# Patient Record
Sex: FEMALE | Race: WHITE | NOT HISPANIC OR LATINO | Employment: FULL TIME | URBAN - METROPOLITAN AREA
[De-identification: names, ages, dates, MRNs, and addresses within clinical notes are randomized per-mention and may not be internally consistent; named-entity substitution may affect disease eponyms.]

---

## 2017-02-06 ENCOUNTER — OFFICE VISIT (OUTPATIENT)
Dept: FAMILY MEDICINE | Facility: CLINIC | Age: 62
End: 2017-02-06

## 2017-02-06 VITALS
HEART RATE: 94 BPM | OXYGEN SATURATION: 98 % | HEIGHT: 67 IN | WEIGHT: 153.4 LBS | TEMPERATURE: 97.9 F | SYSTOLIC BLOOD PRESSURE: 130 MMHG | DIASTOLIC BLOOD PRESSURE: 78 MMHG | RESPIRATION RATE: 16 BRPM | BODY MASS INDEX: 24.08 KG/M2

## 2017-02-06 DIAGNOSIS — R05.9 COUGH: Primary | ICD-10-CM

## 2017-02-06 DIAGNOSIS — R09.82 POST-NASAL DRIP: ICD-10-CM

## 2017-02-06 LAB — S PYO AG THROAT QL IA.RAPID: NEGATIVE

## 2017-02-06 RX ORDER — FLUTICASONE PROPIONATE 50 MCG
1-2 SPRAY, SUSPENSION (ML) NASAL DAILY
Qty: 1 BOTTLE | Refills: 11 | Status: SHIPPED | OUTPATIENT
Start: 2017-02-06 | End: 2017-11-29

## 2017-02-06 ASSESSMENT — ENCOUNTER SYMPTOMS
SHORTNESS OF BREATH: 0
CHEST TIGHTNESS: 0
NAUSEA: 0
SORE THROAT: 0
FATIGUE: 0
CHILLS: 0
COUGH: 1
FEVER: 0
DIARRHEA: 0
HEADACHES: 1
DIAPHORESIS: 0
WHEEZING: 0
VOMITING: 0
UNEXPECTED WEIGHT CHANGE: 0
DYSURIA: 0
ABDOMINAL PAIN: 0
FACIAL SWELLING: 0
APPETITE CHANGE: 0
NECK PAIN: 0
ACTIVITY CHANGE: 0
RHINORRHEA: 1

## 2017-02-06 NOTE — PATIENT INSTRUCTIONS
netti pot. Use in both nostrils 2-3 x per day. Avoid using tap water without boiling or use filtered water.     Use flonase daily  Use sudafed as often as needed  Humidifiers at [facility] are good for coughs.   Avoid allergens if possible    If no improvement in 3 weeks. Follow up and can have you see a specialist.     Here is the plan from today's visit        Thank you for coming to [facility] today.  Lab Testing:  **If you had lab testing today and your results are reassuring or normal they will be mailed to you or sent through [employer] within 7 days.   **If the lab tests need quick action we will call you with the results.  The phone number we will call with results is # [phone] (home) [phone] (work). If this is not the best number please call our clinic and change the number.  Medication Refills:  If you need any refills please call your pharmacy and they will contact us.   If you need to  your refill at a new pharmacy, please contact the new pharmacy directly. The new pharmacy will help you get your medications transferred faster.   Scheduling:  If you have any concerns about today's visit or wish to schedule another appointment please call our office during normal business hours [phone] (8-5:00 M-F)  If a referral was made to a [facility] and you don't get a call from central scheduling please call [phone].  If a Mammogram was ordered for you at [facility] call [phone] to schedule or change your appointment.  If you had an XRay/CT/Ultrasound/MRI ordered the number is [phone] to schedule or change your radiology appointment.   Medical Concerns:  If you have urgent medical concerns please call [phone] at any time of the day.

## 2017-02-06 NOTE — PROGRESS NOTES
HPI:       Lou Chaudhary is a 61 year old who presents for the following  Patient presents with:  New Patient: cough present for over 3 months on and off, congestions present, clear mucas present      Acute Illness   Concerns: Cough  When did it start? 3 mo, intermittent  Is it getting better, worse or staying the same? Unchanged. She has experienced these symptoms before in previous years.     Fever?: No     Chills/Sweats?: No     Headache (location?) YES, occurs with cough    Sinus Pressure?: YES     Conjunctivitis?: No     Ear Pain?: No     Runny nose?:  YES     Congestion?: No     Sore Throat?: No      Cough?:  YES-productive of clear sputum, no hemoptysis     Wheeze?:  No    Decreased Appetite?: No     Nausea?:No     Vomiting?: No     Diarrhea?:   No    Dysuria/Frequency?.:No     Fatigue/Achiness?:No     Sick/Strep Exposure?: No      Therapies Tried and outcome: warm water, rest, cough drop, pseudoephedrine, naturaid. Details:some improvement.     She is a Non-smoker with lifelong history of 6 mo of smoking. She reports a remote history of pulmonary bleb with pneumothorax in the 1970's. Denies any work history involving lumbar yards, ship building, plumbing/construction. No known mold exposure. No recent cave exploration. She has forced air heating which she states exacerbates her symptoms. Symptoms tend to resolve when heating season is over. No hx of asthma.   No flu shot.      Problem, Medication and Allergy Lists were reviewed and are current.  Patient is I reviewed  Past Medical History and Social History..    Soc: Nonsmoker. Lives in rented town house. Works at a coffee shop.          Review of Systems:   Review of Systems   Constitutional: Negative for fever, chills, diaphoresis, activity change, appetite change, fatigue and unexpected weight change.   HENT: Positive for congestion, postnasal drip and rhinorrhea. Negative for ear pain, facial swelling and sore throat.    Respiratory: Positive for  "cough. Negative for chest tightness, shortness of breath and wheezing.    Cardiovascular: Negative for chest pain.   Gastrointestinal: Negative for nausea, vomiting, abdominal pain and diarrhea.   Genitourinary: Negative for dysuria.   Musculoskeletal: Negative for neck pain.   Skin: Negative for rash.   Neurological: Positive for headaches.             Physical Exam:     Patient Vitals for the past 24 hrs:   BP Temp Temp src Pulse Resp SpO2 Height Weight   02/06/17 1411 130/78 mmHg 97.9  F (36.6  C) Oral 94 16 98 % 5' 7\" (170.2 cm) 153 lb 6.4 oz (69.582 kg)     Body mass index is 24.02 kg/(m^2).  Vitals were reviewed and were normal     Physical Exam   Constitutional: She is oriented to person, place, and time. She appears well-developed and well-nourished. No distress.   HENT:   Head: Normocephalic and atraumatic.   Eyes: Conjunctivae and EOM are normal.   Neck: Neck supple.   Cardiovascular: Normal rate, regular rhythm, normal heart sounds and intact distal pulses.  Exam reveals no gallop and no friction rub.    No murmur heard.  Pulmonary/Chest: Effort normal and breath sounds normal. No respiratory distress. She has no wheezes. She has no rales.   Abdominal: Soft. There is no tenderness.   Musculoskeletal: She exhibits no edema.   Lymphadenopathy:     She has no cervical adenopathy.   Neurological: She is alert and oriented to person, place, and time.   Skin: Skin is warm and dry. No rash noted. She is not diaphoretic.   Psychiatric: She has a normal mood and affect.   Vitals reviewed.        Results:      Results from the last 24 hours  Results for orders placed or performed in visit on 02/06/17 (from the past 24 hour(s))   Strep Screen Rapid (Group) ([facility])   Result Value Ref Range    Rapid Strep A Screen NEGATIVE Negative     Assessment and Plan     1.  Chronic Cough - Cough lasting longer than 8 weeks. Ddx includes Post-nasal drip, GERD, asthma. Describes involvement of nasal airways with lots of mucus " and post-nasal drip. Rapid strep was sent because she did complain of sore throat initially but then described it more as irritation with cough. Screen with go for culture. She describes hx of reflux but uses Prilosec and Tums which she is happy with.  She agrees to try a nasal spray. She is also using pseudoephedrine which she feels is helping. She will try these strategies and follow up for x-ray if no improvement.   - fluticasone (FLONASE) 50 MCG/ACT spray; Spray 1-2 sprays into both nostrils daily  Dispense: 1 Bottle; Refill: 11  - Ching Pot    There are no discontinued medications.  Options for treatment and follow-up care were reviewed with the patient. Lou Chaudhary  engaged in the decision making process and verbalized understanding of the options discussed and agreed with the final plan.    Jamie Urias MD [facility]  [facility]  Family Medicine Resident  Pager [phone]        This note is scribed by Francisco Conn, medical student on behalf of JAMIE URIAS.

## 2017-02-06 NOTE — MR AVS SNAPSHOT
After Visit Summary   2/6/2017    Lou Chaudhary    MRN: [id number]           Patient Information     Date Of Birth          1955        Visit Information        Provider Department      2/6/2017 2:00 PM [facility]        Today's Diagnoses     Cough    -  1     Post-nasal drip           Care Instructions    netti pot. Use in both nostrils 2-3 x per day. Avoid using tap water without boiling or use filtered water.     Use flonase daily  Use sudafed as often as needed  Humidifiers at [facility] are good for coughs.   Avoid allergens if possible    If no improvement in 3 weeks. Follow up and can have you see a specialist.     Here is the plan from today's visit        Thank you for coming to [facility] today.  Lab Testing:  **If you had lab testing today and your results are reassuring or normal they will be mailed to you or sent through [employer] within 7 days.   **If the lab tests need quick action we will call you with the results.  The phone number we will call with results is # [phone] (home) [phone] (work). If this is not the best number please call our clinic and change the number.  Medication Refills:  If you need any refills please call your pharmacy and they will contact us.   If you need to  your refill at a new pharmacy, please contact the new pharmacy directly. The new pharmacy will help you get your medications transferred faster.   Scheduling:  If you have any concerns about today's visit or wish to schedule another appointment please call our office during normal business hours [phone] (8-5:00 M-F)  If a referral was made to a [facility] and you don't get a call from central scheduling please call [phone].  If a Mammogram was ordered for you at [facility] call [phone] to schedule or change your appointment.  If you had an XRay/CT/Ultrasound/MRI ordered the number is [phone] to  schedule or change your radiology appointment.   Medical Concerns:  If you have urgent medical concerns please call [phone] at any time of the day.            Follow-ups after your visit        Your next 10 appointments already scheduled     Feb 10, 2017  9:00 AM   [facility]    [address],  [address]  [place] [address]   [phone]              Who to contact     Please call your clinic at [phone] to:    Ask questions about your health    Make or cancel appointments    Discuss your medicines    Learn about your test results    Speak to your doctor   If you have compliments or concerns about an experience at your clinic, or if you wish to file a complaint, please contact [facility] Patient Relations at [phone] or email us at [email or web address]         Additional Information About Your Visit        [employer] Information     [employer] is an electronic gateway that provides easy, online access to your medical records. With [employer], you can request a clinic appointment, read your test results, renew a prescription or communicate with your care team.     To sign up for [employer] visit the website at [email or web address]   You will be asked to enter the access code listed below, as well as some personal information. Please follow the directions to create your username and password.     Your access code is: [email or web address]  Expires: 2017  6:31 AM     Your access code will  in 90 days. If you need help or a new code, please contact your [facility] or call [phone] for assistance.        Care EveryWhere ID     This is your Care EveryWhere ID. This could be used by other organizations to access your [place] medical records  [id number]        Your Vitals Were     Pulse Temperature Respirations Height BMI (Body Mass Index) Pulse  "Oximetry    94 97.9  F (36.6  C) (Oral) 16 5' 7\" (170.2 cm) 24.02 kg/m2 98%       Blood Pressure from Last 3 Encounters:   02/06/17 130/78   03/19/14 142/78    Weight from Last 3 Encounters:   02/06/17 153 lb 6.4 oz (69.582 kg)   03/19/14 153 lb 6.4 oz (69.582 kg)              We Performed the Following     Strep Culture (GABS)     Strep Screen Rapid (Group) ([facility])          Today's Medication Changes          These changes are accurate as of: 2/6/17  3:08 PM.  If you have any questions, ask your nurse or doctor.               Start taking these medicines.        Dose/Directions    fluticasone 50 MCG/ACT spray   Commonly known as:  FLONASE   Used for:  Cough, Post-nasal drip   Started by:  Jamie Urias MD        Dose:  1-2 spray   Spray 1-2 sprays into both nostrils daily   Quantity:  1 Bottle   Refills:  11            Where to get your medicines      These medications were sent to [facility] #[id number] - [address] [address]     Phone:  [phone]    - fluticasone 50 MCG/ACT spray             Primary Care Provider    Provider Unknown       No address on file        Thank you!     Thank you for choosing [facility]  for your care. Our goal is always to provide you with excellent care. Hearing back from our patients is one way we can continue to improve our services. Please take a few minutes to complete the written survey that you may receive in the mail after your visit with us. Thank you!             Your Updated Medication List - Protect others around you: Learn how to safely use, store and throw away your medicines at www.disposemymeds.org.          This list is accurate as of: 2/6/17  3:08 PM.  Always use your most recent med list.                   Brand Name Dispense Instructions for use    fluticasone 50 MCG/ACT spray    FLONASE    1 Bottle    Spray 1-2 sprays into both nostrils daily       IBUPROFEN PO      " Take 2 tablets by mouth as needed

## 2017-02-08 LAB
BACTERIA SPEC CULT: NORMAL
MICRO REPORT STATUS: NORMAL
SPECIMEN SOURCE: NORMAL

## 2017-02-10 ENCOUNTER — OFFICE VISIT (OUTPATIENT)
Dept: FAMILY MEDICINE | Facility: CLINIC | Age: 62
End: 2017-02-10

## 2017-02-10 VITALS
HEART RATE: 101 BPM | OXYGEN SATURATION: 98 % | HEIGHT: 67 IN | TEMPERATURE: 97.8 F | BODY MASS INDEX: 23.79 KG/M2 | RESPIRATION RATE: 14 BRPM | WEIGHT: 151.6 LBS | SYSTOLIC BLOOD PRESSURE: 121 MMHG | DIASTOLIC BLOOD PRESSURE: 77 MMHG

## 2017-02-10 DIAGNOSIS — Z00.00 ROUTINE GENERAL MEDICAL EXAMINATION AT A HEALTH CARE FACILITY: Primary | ICD-10-CM

## 2017-02-10 DIAGNOSIS — J01.00 SUBACUTE MAXILLARY SINUSITIS: ICD-10-CM

## 2017-02-10 DIAGNOSIS — R05.9 COUGH: ICD-10-CM

## 2017-02-10 PROBLEM — M41.20 IDIOPATHIC SCOLIOSIS: Status: ACTIVE | Noted: 2017-02-10

## 2017-02-10 RX ORDER — AMOXICILLIN 500 MG/1
500 CAPSULE ORAL 3 TIMES DAILY
Qty: 30 CAPSULE | Refills: 0 | Status: SHIPPED | OUTPATIENT
Start: 2017-02-10 | End: 2017-11-29

## 2017-02-10 NOTE — MR AVS SNAPSHOT
After Visit Summary   2/10/2017    Lou Chaudhary    MRN: [id number]           Patient Information     Date Of Birth          1955        Visit Information        Provider Department      2/10/2017 9:00 AM Danelle Mesa MD [facility]        Today's Diagnoses     Routine general medical examination at a health care facility    -  1     Cough         Subacute maxillary sinusitis           Care Instructions    Here is the plan from today's visit    1. Routine general medical examination at a health care facility    2. Cough - this looked fine   - XR CHEST 2 VW; Future    3. Subacute maxillary sinusitis - take the amoxicillin three times a day for the next 10 days. If you are not somewhat better, then you could switch to the Augmentin and take that 2 times a day until you are better (close to done).   Keep doing the flonase once a day and also the Nettipot 2 - 3 times a day.    - amoxicillin-clavulanate (AUGMENTIN) 875-125 MG per tablet; Take 1 tablet by mouth 2 times daily  Dispense: 20 tablet; Refill: 0  - amoxicillin (AMOXIL) 500 MG capsule; Take 1 capsule (500 mg) by mouth 3 times daily  Dispense: 30 capsule; Refill: 0          Please call or return to clinic if your symptoms don't go away.    Follow up plan      Thank you for coming to [facility] today.  Lab Testing:  **If you had lab testing today and your results are reassuring or normal they will be mailed to you or sent through [employer] within 7 days.   **If the lab tests need quick action we will call you with the results.  The phone number we will call with results is # [phone] (home) [phone] (work). If this is not the best number please call our clinic and change the number.  Medication Refills:  If you need any refills please call your pharmacy and they will contact us.   If you need to  your refill at a new pharmacy, please contact the new pharmacy directly. The new pharmacy will help you get  your medications transferred faster.   Scheduling:  If you have any concerns about today's visit or wish to schedule another appointment please call our office during normal business hours [phone] (8-5:00 M-F)  If a referral was made to a [facility] and you don't get a call from central scheduling please call [phone].  If a Mammogram was ordered for you at [facility] call [phone] to schedule or change your appointment.  If you had an XRay/CT/Ultrasound/MRI ordered the number is [phone] to schedule or change your radiology appointment.   Medical Concerns:  If you have urgent medical concerns please call [phone] at any time of the day.  If you have a medical emergency please call [phone].        Preventive Health Recommendations  Female Ages 50 - 64    Yearly exam: See your health care provider every year in order to  o Review health changes.   o Discuss preventive care.    o Review your medicines if your doctor has prescribed any.      Get a Pap test every three years (unless you have an abnormal result and your provider advises testing more often).    If you get Pap tests with HPV test, you only need to test every 5 years, unless you have an abnormal result.     You do not need a Pap test if your uterus was removed (hysterectomy) and you have not had cancer.    You should be tested each year for STDs (sexually transmitted diseases) if you're at risk.     Have a mammogram every 1 to 2 years.    Have a colonoscopy at age 50, or have a yearly FIT test (stool test). These exams screen for colon cancer.      Have a cholesterol test every 5 years, or more often if advised.    Have a diabetes test (fasting glucose) every three years. If you are at risk for diabetes, you should have this test more often.     If you are at risk for osteoporosis (brittle bone disease), think about having a bone density scan (DEXA).    Shots: Get a flu shot each year. Get a tetanus shot every  10 years.    Nutrition:     Eat at least 5 servings of fruits and vegetables each day.    Eat whole-grain bread, whole-wheat pasta and brown rice instead of white grains and rice.    Talk to your provider about Calcium and Vitamin D.     Lifestyle    Exercise at least 150 minutes a week (30 minutes a day, 5 days a week). This will help you control your weight and prevent disease.    Limit alcohol to one drink per day.    No smoking.     Wear sunscreen to prevent skin cancer.     See your dentist every six months for an exam and cleaning.    See your eye doctor every 1 to 2 years.            Follow-ups after your visit        Who to contact     Please call your clinic at [phone] to:    Ask questions about your health    Make or cancel appointments    Discuss your medicines    Learn about your test results    Speak to your doctor   If you have compliments or concerns about an experience at your clinic, or if you wish to file a complaint, please contact [facility] Patient Relations at [phone] or email us at [email or web address]         Additional Information About Your Visit        [employer]Book Information     [employer] is an electronic gateway that provides easy, online access to your medical records. With [employer], you can request a clinic appointment, read your test results, renew a prescription or communicate with your care team.     To sign up for [employer] visit the website at [email or web address]   You will be asked to enter the access code listed below, as well as some personal information. Please follow the directions to create your username and password.     Your access code is: [email or web address]  Expires: 2017  6:31 AM     Your access code will  in 90 days. If you need help or a new code, please contact your [facility] or call [phone] for assistance.        Care EveryWhere ID     This is your Care EveryWhere ID. This could  "be used by other organizations to access your [place] medical records  [id number]        Your Vitals Were     Pulse Temperature Respirations Height BMI (Body Mass Index) Pulse Oximetry    101 97.8  F (36.6  C) (Oral) 14 5' 7\" (170.2 cm) 23.74 kg/m2 98%       Blood Pressure from Last 3 Encounters:   02/10/17 121/77   02/06/17 130/78   03/19/14 142/78    Weight from Last 3 Encounters:   02/10/17 151 lb 9.6 oz (68.765 kg)   02/06/17 153 lb 6.4 oz (69.582 kg)   03/19/14 153 lb 6.4 oz (69.582 kg)                 Today's Medication Changes          These changes are accurate as of: 2/10/17 10:29 AM.  If you have any questions, ask your nurse or doctor.               Start taking these medicines.        Dose/Directions    amoxicillin 500 MG capsule   Commonly known as:  AMOXIL   Used for:  Subacute maxillary sinusitis   Started by:  Danelle Mesa MD        Dose:  500 mg   Take 1 capsule (500 mg) by mouth 3 times daily   Quantity:  30 capsule   Refills:  0       amoxicillin-clavulanate 875-125 MG per tablet   Commonly known as:  AUGMENTIN   Used for:  Subacute maxillary sinusitis   Started by:  Danelle Mesa MD        Dose:  1 tablet   Take 1 tablet by mouth 2 times daily   Quantity:  20 tablet   Refills:  0            Where to get your medicines      These medications were sent to [facility] #[id number] - [place], [place] - [address] [place] [address]     Phone:  [phone]    - amoxicillin 500 MG capsule      Some of these will need a paper prescription and others can be bought over the counter.  Ask your nurse if you have questions.     Bring a paper prescription for each of these medications    - amoxicillin-clavulanate 875-125 MG per tablet             Primary Care Provider Office Phone # Fax #    Jamie Urias [phone]       [facility] [address]  [facility] [address]        Thank you!     Thank you for choosing [facility]  for your care. Our goal is always to provide you with excellent care. Hearing back from our patients is one way we can continue to improve our services. Please take a few minutes to complete the written survey that you may receive in the mail after your visit with us. Thank you!             Your Updated Medication List - Protect others around you: Learn how to safely use, store and throw away your medicines at www.disposemymeds.org.          This list is accurate as of: 2/10/17 10:29 AM.  Always use your most recent med list.                   Brand Name Dispense Instructions for use    amoxicillin 500 MG capsule    AMOXIL    30 capsule    Take 1 capsule (500 mg) by mouth 3 times daily       amoxicillin-clavulanate 875-125 MG per tablet    AUGMENTIN    20 tablet    Take 1 tablet by mouth 2 times daily       fluticasone 50 MCG/ACT spray    FLONASE    1 Bottle    Spray 1-2 sprays into both nostrils daily       IBUPROFEN PO      Take 2 tablets by mouth as needed

## 2017-02-10 NOTE — PATIENT INSTRUCTIONS
Here is the plan from today's visit    1. Routine general medical examination at a health care facility    2. Cough - this looked fine   - XR CHEST 2 VW; Future    3. Subacute maxillary sinusitis - take the amoxicillin three times a day for the next 10 days. If you are not somewhat better, then you could switch to the Augmentin and take that 2 times a day until you are better (close to done).   Keep doing the flonase once a day and also the Nettipot 2 - 3 times a day.    - amoxicillin-clavulanate (AUGMENTIN) 875-125 MG per tablet; Take 1 tablet by mouth 2 times daily  Dispense: 20 tablet; Refill: 0  - amoxicillin (AMOXIL) 500 MG capsule; Take 1 capsule (500 mg) by mouth 3 times daily  Dispense: 30 capsule; Refill: 0          Please call or return to clinic if your symptoms don't go away.    Follow up plan      Thank you for coming to [facility] today.  Lab Testing:  **If you had lab testing today and your results are reassuring or normal they will be mailed to you or sent through [employer] within 7 days.   **If the lab tests need quick action we will call you with the results.  The phone number we will call with results is # [phone] (home) [phone] (work). If this is not the best number please call our clinic and change the number.  Medication Refills:  If you need any refills please call your pharmacy and they will contact us.   If you need to  your refill at a new pharmacy, please contact the new pharmacy directly. The new pharmacy will help you get your medications transferred faster.   Scheduling:  If you have any concerns about today's visit or wish to schedule another appointment please call our office during normal business hours [phone] (8-5:00 M-F)  If a referral was made to a [facility] and you don't get a call from central scheduling please call [phone].  If a Mammogram was ordered for you at [facility] call [phone] to schedule or change  your appointment.  If you had an XRay/CT/Ultrasound/MRI ordered the number is [phone] to schedule or change your radiology appointment.   Medical Concerns:  If you have urgent medical concerns please call [phone] at any time of the day.  If you have a medical emergency please call 211.        Preventive Health Recommendations  Female Ages 50 - 64    Yearly exam: See your health care provider every year in order to  o Review health changes.   o Discuss preventive care.    o Review your medicines if your doctor has prescribed any.      Get a Pap test every three years (unless you have an abnormal result and your provider advises testing more often).    If you get Pap tests with HPV test, you only need to test every 5 years, unless you have an abnormal result.     You do not need a Pap test if your uterus was removed (hysterectomy) and you have not had cancer.    You should be tested each year for STDs (sexually transmitted diseases) if you're at risk.     Have a mammogram every 1 to 2 years.    Have a colonoscopy at age 50, or have a yearly FIT test (stool test). These exams screen for colon cancer.      Have a cholesterol test every 5 years, or more often if advised.    Have a diabetes test (fasting glucose) every three years. If you are at risk for diabetes, you should have this test more often.     If you are at risk for osteoporosis (brittle bone disease), think about having a bone density scan (DEXA).    Shots: Get a flu shot each year. Get a tetanus shot every 10 years.    Nutrition:     Eat at least 5 servings of fruits and vegetables each day.    Eat whole-grain bread, whole-wheat pasta and brown rice instead of white grains and rice.    Talk to your provider about Calcium and Vitamin D.     Lifestyle    Exercise at least 150 minutes a week (30 minutes a day, 5 days a week). This will help you control your weight and prevent disease.    Limit alcohol to one drink per day.    No smoking.     Wear  sunscreen to prevent skin cancer.     See your dentist every six months for an exam and cleaning.    See your eye doctor every 1 to 2 years.

## 2017-02-10 NOTE — PROGRESS NOTES
Female Physical Note          HPI         Concerns today: CPE and cough    Actually has a gyn doc she sees annually and so opted to make this an acute visit:    When first started coughing felt like it was deep in her left lung - like she had inhaled something - started in December.  At the times did not have fever, chills but did have runny nose.  Seemed to start mostly with coughing.  Did get better a couple of times and now is back.  Started again about a week ago (wsa gone for a full week). This time came back stronger.  No fever, chills.  + rhinnorhea and ear symptoms.  Notes a bit of SOB when she coughs - hard to get a deep breath.  When she coughs a lot, it doesn't seem to clear it.  Does think the mucus is draining down the back of her throat.    Does not have asthma.  Wonders if there is a wheeze there.   Has been taking the Sudofed - that will help in the past but not as much as she expects.  Is using the flonase.  Just started it for last 2 - 3 days.  Not aware of allergies.  Also tried the nettipot and not much difference all though helps a bit.   Has some left lower back pain as well that might be from coughing.       Sees an OB/gyn in [facility] and has had her wellwoman care there.  She also thinks she has had a Dexa there about 2 - 3 years ago.  Did her cholesterol there too.      Not a smoker.   Patient Active Problem List   Diagnosis     History of melanoma       Past Medical History   Diagnosis Date     Malignant melanoma (H)      Scoliosis        Previous Medical Care   As above     Family History   Problem Relation Age of Onset     DIABETES Maternal Grandmother      Asthma Brother      Allergies Son      Hearing Loss No family hx of      Skin Cancer Sister      BCC              Review of Systems:     Review of Systems:  CONSTITUTIONAL: NEGATIVE for fever, chills, change in weight  INTEGUMENTARY/SKIN: NEGATIVE for worrisome rashes, moles or lesions  EYES: NEGATIVE for vision changes or  "irritation  ENT/MOUTH: as above  BREAST: NEGATIVE for masses, tenderness or discharge  CV: NEGATIVE for chest pain, palpitations or peripheral edema  GI: NEGATIVE for nausea, abdominal pain, heartburn, or change in bowel habits  : NEGATIVE for frequency, dysuria, or hematuria  MUSCULOSKELETAL: right ankle injury when tap dances and is seeing someone for that.   NEURO: NEGATIVE for weakness, dizziness or paresthesias  ENDOCRINE: NEGATIVE for temperature intolerance, skin/hair changes  HEME/ALLERGY: NEGATIVE for bleeding problems  PSYCHIATRIC: NEGATIVE for changes in mood or affect         Social History     Social History     Social History     Marital Status: Single     Spouse Name: N/A     Number of Children: N/A     Years of Education: N/A     Occupational History     Not on file.     Social History Main Topics     Smoking status: Former Smoker     Smokeless tobacco: Never Used      Comment: Smoked for 6 months     Alcohol Use: Yes      Comment: social     Drug Use: No     Sexual Activity: Not on file     Other Topics Concern     Not on file     Social History Narrative              Physical Exam:     Vitals: /77 mmHg  Pulse 101  Temp(Src) 97.8  F (36.6  C) (Oral)  Resp 14  Ht 5' 7\" (170.2 cm)  Wt 151 lb 9.6 oz (68.765 kg)  BMI 23.74 kg/m2  SpO2 98%  BMI= Body mass index is 23.74 kg/(m^2).   GENERAL: healthy, alert and no distress  EYES: Eyes grossly normal to inspection, extraocular movements - intact, and PERRL  HENT: ear canals- normal; TMs- retracted; Nose- thick drainage.  OP - posterior cobblestoning; Mouth- no ulcers, no lesions  NECK: no tenderness, no adenopathy, no asymmetry, no masses, no stiffness; thyroid- normal to palpation  RESP: lungs clear to auscultation - no rales, no rhonchi, no wheezes  CV: regular rates and rhythm, normal S1 S2, no S3 or S4 and no murmur, no click or rub -  ABDOMEN: soft, no tenderness, no  hepatosplenomegaly, no masses, normal bowel sounds  MS: extremities- " no gross deformities noted, no edema        Assessment and Plan      Lou was seen today for physical and uri.    Diagnoses and all orders for this visit:    Routine general medical examination at a health care facility - not indicated since gets care elsewhere      Cough - lungs clear but been coughing for 2 months and so wanted to rule out pathology - negative by my read but harder to tell due to scoliosis.  No wheeze heard  -     XR CHEST 2 VW; Future    Subacute maxillary sinusitis - reviewed that this might not help, that she may have had 3 viral infections in a row but she does meet criteria. Will try amox (she rather not go with augmentin) and if no better in 3 days, will switch to augmentin.   -     amoxicillin-clavulanate (AUGMENTIN) 875-125 MG per tablet; Take 1 tablet by mouth 2 times daily  -     amoxicillin (AMOXIL) 500 MG capsule; Take 1 capsule (500 mg) by mouth 3 times daily    Total time - 25 min with more than half spent counseling on options for her URI.     Options for treatment and follow-up care were reviewed with the patient . Lou Chaudhary and/or guardian engaged in the decision making process and verbalized understanding of the options discussed and agreed with the final plan.    Danelle Mesa MD

## 2017-03-20 ENCOUNTER — TRANSFERRED RECORDS (OUTPATIENT)
Dept: HEALTH INFORMATION MANAGEMENT | Facility: CLINIC | Age: 62
End: 2017-03-20

## 2017-03-23 LAB — MAMMOGRAM: NORMAL

## 2017-06-03 ENCOUNTER — HEALTH MAINTENANCE LETTER (OUTPATIENT)
Age: 62
End: 2017-06-03

## 2017-08-08 ENCOUNTER — TELEPHONE (OUTPATIENT)
Dept: DERMATOLOGY | Facility: CLINIC | Age: 62
End: 2017-08-08

## 2017-08-08 NOTE — TELEPHONE ENCOUNTER
Patient states she has an appt with [facility] 9/8. States symptoms appeared this am and notes burning sensation and redness have decreased. She came in contact with eucalyptus leaves and feels this may have caused the irritation. Denies vision changes. Patient will contact clinic if symptoms worsen and seek UC/ED if emergent. States understanding and agrees with plan.

## 2017-08-08 NOTE — TELEPHONE ENCOUNTER
----- Message from Robyn Alvarez sent at 8/8/2017 10:21 AM CDT -----  Regarding: pink spots on face and burning around eyes/ Dr. Grijalva  Contact: [phone]  Pt is requesting a call back, pt is having some burning on skin by her eyes, she is also has pink spots on face. Pt already has an appointment in November and I did advise her what the soonest available I had.             Patient can be reached at number above.      In regards,     RD      Please DO NOT send this message and/or reply back to sender. Call Center Representatives DO NOT respond to messages.

## 2017-09-22 ENCOUNTER — TRANSFERRED RECORDS (OUTPATIENT)
Dept: HEALTH INFORMATION MANAGEMENT | Facility: CLINIC | Age: 62
End: 2017-09-22

## 2017-09-29 PROBLEM — D12.8 BENIGN NEOPLASM OF RECTUM: Status: ACTIVE | Noted: 2017-09-29

## 2017-09-29 PROBLEM — K92.1 HEMATOCHEZIA: Status: ACTIVE | Noted: 2017-09-29

## 2017-11-22 ENCOUNTER — OFFICE VISIT (OUTPATIENT)
Dept: DERMATOLOGY | Facility: CLINIC | Age: 62
End: 2017-11-22

## 2017-11-22 DIAGNOSIS — C44.91 BCC (BASAL CELL CARCINOMA): ICD-10-CM

## 2017-11-22 DIAGNOSIS — Z86.018 HISTORY OF DYSPLASTIC NEVUS: ICD-10-CM

## 2017-11-22 DIAGNOSIS — Z85.820 HISTORY OF MELANOMA: Primary | ICD-10-CM

## 2017-11-22 DIAGNOSIS — D48.5 NEOPLASM OF UNCERTAIN BEHAVIOR OF SKIN: ICD-10-CM

## 2017-11-22 DIAGNOSIS — L57.0 ACTINIC KERATOSIS: ICD-10-CM

## 2017-11-22 ASSESSMENT — PAIN SCALES - GENERAL
PAINLEVEL: NO PAIN (0)
PAINLEVEL: NO PAIN (0)

## 2017-11-22 NOTE — PATIENT INSTRUCTIONS
Wound Care After a Biopsy    What is a skin biopsy?  A skin biopsy allows the doctor to examine a very small piece of tissue under the microscope to determine the diagnosis and the best treatment for the skin condition. A local anesthetic (numbing medicine)  is injected with a very small needle into the skin area to be tested. A small piece of skin is taken from the area. Sometimes a suture (stitch) is used.     What are the risks of a skin biopsy?  I will experience scar, bleeding, swelling, pain, crusting and redness. I may experience incomplete removal or recurrence. Risks of this procedure are excessive bleeding, bruising, infection, nerve damage, numbness, thick (hypertrophic or keloidal) scar and non-diagnostic biopsy.    How should I care for my wound for the first 24 hours?    Keep the wound dry and covered for 24 hours    If it bleeds, hold direct pressure on the area for 15 minutes. If bleeding does not stop then go to the emergency room    Avoid strenuous exercise the first 1-2 days or as your doctor instructs you    How should I care for the wound after 24 hours?    After 24 hours, remove the bandage    You may bathe or shower as normal    If you had a scalp biopsy, you can shampoo as usual and can use shower water to clean the biopsy site daily    Clean the wound twice a day with gentle soap and water    Do not scrub, be gentle    Apply white petroleum/Vaseline after cleaning the wound with a cotton swab or a clean finger, and keep the site covered with a Bandaid /bandage. Bandages are not necessary with a scalp biopsy    If you are unable to cover the site with a Bandaid /bandage, re-apply ointment 2-3 times a day to keep the site moist. Moisture will help with healing    Avoid strenuous activity for first 1-2 days    Avoid lakes, rivers, pools, and oceans until the stitches are removed or the site is healed    How do I clean my wound?    Wash hands thoroughly with soap or use hand  before all  wound care    Clean the wound with gentle soap and water    Apply white petroleum/Vaseline  to wound after it is clean    Replace the Bandaid /bandage to keep the wound covered for the first few days or as instructed by your doctor    If you had a scalp biopsy, warm shower water to the area on a daily basis should suffice    What should I use to clean my wound?     Cotton-tipped applicators (Qtips )    White petroleum jelly (Vaseline ). Use a clean new container and use Q-tips to apply.    Bandaids   as needed    Gentle soap     How should I care for my wound long term?    Do not get your wound dirty    Keep up with wound care for one week or until the area is healed.    A small scab will form and fall off by itself when the area is completely healed. The area will be red and will become pink in color as it heals. Sun protection is very important for how your scar will turn out. Sunscreen with an SPF 30 or greater is recommended once the area is healed.    If you have stitches, stitches need to be removed in 11-14 days. You may return to our clinic for this or you may have it done locally at your doctor s office.    You should have some soreness but it should be mild and slowly go away over several days. Talk to your doctor about using tylenol for pain,    When should I call my doctor?  If you have increased:     Pain or swelling    Pus or drainage (clear or slightly yellow drainage is ok)    Temperature over 100F    Spreading redness or warmth around wound    When will I hear about my results?  The biopsy results can take 2-3 weeks to come back. The clinic will call you with the results, send you a [employer] message, or have you schedule a follow-up clinic or phone time to discuss the results. Contact our clinics if you do not hear from us in 3 weeks.     Who should I call with questions?    [facility]: [phone]     [facility]: [phone]    For  urgent needs outside of business hours call the [facility] at [phone] and ask for the dermatology resident on call      Cryotherapy    What is it?    Use of a very cold liquid, such as liquid nitrogen, to freeze and destroy abnormal skin cells that need to be removed    What should I expect?    Tenderness and redness    A small blister that might grow and fill with dark purple blood. There may be crusting.    More than one treatment may be needed if the lesions do not go away.    How do I care for the treated area?    Gently wash the area with your hands when bathing.    Use a thin layer of Vaseline to help with healing. You may use a Band-Aid.     The area should heal within 7-10 days and may leave behind a pink or lighter color.     Do not use an antibiotic or Neosporin ointment.     You may take acetaminophen (Tylenol) for pain.     Call your Doctor if you have:    Severe pain    Signs of infection (warmth, redness, cloudy yellow drainage, and or a bad smell)    Questions or concerns    Who should I call with questions?       [facility]: [phone]       [facility]: [phone]       For urgent needs outside of business hours call the [facility] at [phone]        and ask for the dermatology resident on call

## 2017-11-22 NOTE — LETTER
"11/22/2017       RE: Lou Chaudhary  [address] [address]     Dear Colleague,    Thank you for referring your patient, Lou Chaudhary, to the [facility] DERMATOLOGY at [facility]. Please see a copy of my visit note below.    [facility] Dermatology Note      Dermatology Problem List:  1. Malignant melanoma in situ, right dorsal forearm  -Stage IA  -Breslow's 0.42m  -no mites, nonulcerated  -s/p WLE 12/8/2015    2. Actinic keratosis  -s/p cryotherapy    3. Mildly dysplastic junctional nevus, lower back  -s/p excision 11/27/2015    4. Psoriasiform dermatitis, right earlobe  -s/p biopsy 1//2016  -No current flare or Tx    Encounter Date: Nov 22, 2017    CC:  Chief Complaint   Patient presents with     Skin Check     Skin check, Lou states \" I have a little spot on my nose and maybe one behind my ear.\"     History of Present Illness:  Ms. Lou Chaudhary is a 62 year old female who was last seen here in Dermatology 12/07/2016. She presents as a follow-up skin check for history of melanoma, actinic keratosis, and dysplastic nevus, please refer to Dermatology problem list. Today, the patient states that she has a small rough spot on her nose still remaining since the last cryotherpy. She also has a bump behind her R ear that has been present for a month, and is completely asymptomatic. Additionally, there are two spots in between her breasts that been unchanged for years, but are slightly itchy. She requests they be removed today. Otherwise, she hasn't noticed any skin changes. She has been generally feeling well since her last visit. She is receiving regular eye examinations.    Past Medical History:   Patient Active Problem List   Diagnosis     History of melanoma     Idiopathic scoliosis     Hematochezia     Benign neoplasm of rectum     History of dysplastic nevus     Past Medical History:   Diagnosis Date     Malignant melanoma (H)      Scoliosis  "     Past Surgical History:   Procedure Laterality Date     BIOPSY OF SKIN LESION       MOHS MICROGRAPHIC PROCEDURE       Social History:  The patient works as a  and , she is retired from the post office. The patient admits to use of tanning beds in the past.    Family History:  There is no family history of skin cancer. and There is no family history of melanoma.    Medications:  Current Outpatient Prescriptions   Medication Sig Dispense Refill     amoxicillin-clavulanate (AUGMENTIN) 875-125 MG per tablet Take 1 tablet by mouth 2 times daily (Patient not taking: Reported on 11/22/2017) 20 tablet 0     amoxicillin (AMOXIL) 500 MG capsule Take 1 capsule (500 mg) by mouth 3 times daily (Patient not taking: Reported on 11/22/2017) 30 capsule 0     fluticasone (FLONASE) 50 MCG/ACT spray Spray 1-2 sprays into both nostrils daily (Patient not taking: Reported on 11/22/2017) 1 Bottle 11     IBUPROFEN PO Take 2 tablets by mouth as needed       No Known Allergies    Review of Systems:  -A focused ROS was completed and revealed no abnormalities.  -Constitutional: The patient denies fatigue, fevers, chills, unintended weight loss.  -Skin: As above in HPI. No additional skin concerns.    Physical exam:  Vitals: There were no vitals taken for this visit.  GEN: This is a well developed, well-nourished female in no acute distress, in a pleasant mood.    SKIN: Total skin including the undergarment areas was performed. The exam included the head/face, neck, both arms, chest, back, abdomen, both legs, digits and/or nails, buttocks, and genitalia.   -There is a 1 mm hyperkeratotic macule over the nasal bridge consistent with an actinic keratosis.  -There is a well circumscribed slightly pearly flat topped flesh colored papule with faint surrounding erythema over the R posterior auricular skin consistent with inflamed seborrhoic keratosis vs. basal cell carcinoma.  -There are well healed surgical scars without  hyperpigmentation, erythema, nodularity or telangiectasias on the R dorsal forearm and lower back.   -There are waxy stuck on tan to brown papules in the L inframammary fold consistent with seborrheic keratoses.  -Over the central chest and central abdomen near the subcostal margin, there are 2 well circumscribed soft flesh colored to mildly erythematous papules consistent with benign nevi.  -There are areas of slightly hypopigmented and hyperpigmented red brown macules with some follicular prominence over the neck with a few faint telangiectias consistent with mild atrophy and poikiloderma.   LYMPH: No lymphadenopathy in cervical, axillary, inguinal, or popliteal region.  -Normal oral exam.  -No other lesions of concern on areas examined.     Impression/Plan:    1. Actinic keratosis, nasal bridge   Cryotherapy procedure note: After verbal consent and discussion of risks and benefits including but no limited to dyspigmentation/scar, blister, and pain, 1 AK was(were) treated with 1-2mm freeze border for 1 cycle with liquid nitrogen. Post cryotherapy instructions were provided.     2.    Neoplasm of uncertain behavior of skin, central chest, abdomen    Over the central chest and central abdomen near the subcostal margin, there are 2 well circumscribed soft flesh colored to mildy erythematous papules consistent with benign nevi that the patient requests be removed today due to itching.    Punch biopsy(performed by faculty): After discussion of benefits and risks including but not limited to bleeding, infection, scar, incomplete removal, recurrence, and non-diagnostic biopsy, written consent and photographs were obtained. Time-out was performed. The area was cleaned with isopropyl alcohol. 1% plain lidocaine was injected to obtain adequate anesthesia of the lesions. Two 3 mm punch biopsies were performed.  4-0 prolene sutures were utilized to approximate the epidermal edges.  White petroleum jelly/VaselineTM and a  bandage was applied to the wound.  Explicit verbal and written wound care instructions were provided.  The patient left the Dermatology Clinic in good condition.     3.   Neoplasm of uncertain behavior of skin, R posterior aurricular    There is a well circumscribed slightly pearly flat topped papule with faint surrounding erythema over the R posterior auricular skin consistent with inflamed seborrhoic keratosis vs. basal cell carcinoma.    After discussion of benefits and risks including but not limited to bleeding, infection, scar, incomplete removal, recurrence, and non-diagnostic biopsy, written consent and photographs were obtained. The area was cleaned with isopropyl alcohol. 1% plain lidocaine was injected to obtain adequate anesthesia of the lesion on the R posterior auricular skin. A shave biopsy was performed. Hemostasis was achieved with aluminium chloride. Vaseline and a sterile dressing were applied. The patient tolerated the procedure and no complications were noted. The patient was provided with verbal and written post care instructions.     4.    Skin exam in the setting of melanoma Hx, R forearm    Sun precaution was advised including the use of sun screens of SPF 30 or higher, sun protective clothing, and avoidance of tanning beds.    There is a well healed surgical scar without hyperpigmentation, erythema, nodularity or telangiectasias on the R dorsal forearm in the site of prior melanoma excision.    We discussed that your skin findings today are generally non-concerning. There are areas of slightly hypopigmented and hyperpigmented red brown macules with some follicular prominence over the neck with a few faint telangiectias consistent with mild atrophy and poikiloderma. We counseled about laser options today and our nursing representative will call you to schedule a consultation in cosmetic clinic.    Follow-up in 8 months or sooner if new or changing lesions.  Stitch removal 1-2 weeks.      Staff Involved:  Scribed by MIGUEL ÁNGEL Guajardo for Dr. Grijalva.                        Again, thank you for allowing me to participate in the care of your patient.      Sincerely,    Ashley Grijalva MD

## 2017-11-22 NOTE — MR AVS SNAPSHOT
After Visit Summary   11/22/2017    Lou Chaudhary    MRN: [id number]           Patient Information     Date Of Birth          1955        Visit Information        Provider Department      11/22/2017 8:45 AM Ashley Grijalva MD [facility] Dermatology        Today's Diagnoses     History of melanoma    -  1    History of dysplastic nevus        Actinic keratosis        Neoplasm of uncertain behavior of skin          Care Instructions    Wound Care After a Biopsy    What is a skin biopsy?  A skin biopsy allows the doctor to examine a very small piece of tissue under the microscope to determine the diagnosis and the best treatment for the skin condition. A local anesthetic (numbing medicine)  is injected with a very small needle into the skin area to be tested. A small piece of skin is taken from the area. Sometimes a suture (stitch) is used.     What are the risks of a skin biopsy?  I will experience scar, bleeding, swelling, pain, crusting and redness. I may experience incomplete removal or recurrence. Risks of this procedure are excessive bleeding, bruising, infection, nerve damage, numbness, thick (hypertrophic or keloidal) scar and non-diagnostic biopsy.    How should I care for my wound for the first 24 hours?    Keep the wound dry and covered for 24 hours    If it bleeds, hold direct pressure on the area for 15 minutes. If bleeding does not stop then go to the emergency room    Avoid strenuous exercise the first 1-2 days or as your doctor instructs you    How should I care for the wound after 24 hours?    After 24 hours, remove the bandage    You may bathe or shower as normal    If you had a scalp biopsy, you can shampoo as usual and can use shower water to clean the biopsy site daily    Clean the wound twice a day with gentle soap and water    Do not scrub, be gentle    Apply white petroleum/Vaseline after cleaning the wound with a cotton swab or a clean finger, and keep the site covered with a  Bandaid /bandage. Bandages are not necessary with a scalp biopsy    If you are unable to cover the site with a Bandaid /bandage, re-apply ointment 2-3 times a day to keep the site moist. Moisture will help with healing    Avoid strenuous activity for first 1-2 days    Avoid lakes, rivers, pools, and oceans until the stitches are removed or the site is healed    How do I clean my wound?    Wash hands thoroughly with soap or use hand  before all wound care    Clean the wound with gentle soap and water    Apply white petroleum/Vaseline  to wound after it is clean    Replace the Bandaid /bandage to keep the wound covered for the first few days or as instructed by your doctor    If you had a scalp biopsy, warm shower water to the area on a daily basis should suffice    What should I use to clean my wound?     Cotton-tipped applicators (Qtips )    White petroleum jelly (Vaseline ). Use a clean new container and use Q-tips to apply.    Bandaids   as needed    Gentle soap     How should I care for my wound long term?    Do not get your wound dirty    Keep up with wound care for one week or until the area is healed.    A small scab will form and fall off by itself when the area is completely healed. The area will be red and will become pink in color as it heals. Sun protection is very important for how your scar will turn out. Sunscreen with an SPF 30 or greater is recommended once the area is healed.    If you have stitches, stitches need to be removed in 11-14 days. You may return to our clinic for this or you may have it done locally at your doctor s office.    You should have some soreness but it should be mild and slowly go away over several days. Talk to your doctor about using tylenol for pain,    When should I call my doctor?  If you have increased:     Pain or swelling    Pus or drainage (clear or slightly yellow drainage is ok)    Temperature over 100F    Spreading redness or warmth around wound    When  will I hear about my results?  The biopsy results can take 2-3 weeks to come back. The clinic will call you with the results, send you a [employer] message, or have you schedule a follow-up clinic or phone time to discuss the results. Contact our clinics if you do not hear from us in 3 weeks.     Who should I call with questions?    [facility]: [phone]     [facility]: [phone]    For urgent needs outside of business hours call the [facility] at [phone] and ask for the dermatology resident on call      Cryotherapy    What is it?    Use of a very cold liquid, such as liquid nitrogen, to freeze and destroy abnormal skin cells that need to be removed    What should I expect?    Tenderness and redness    A small blister that might grow and fill with dark purple blood. There may be crusting.    More than one treatment may be needed if the lesions do not go away.    How do I care for the treated area?    Gently wash the area with your hands when bathing.    Use a thin layer of Vaseline to help with healing. You may use a Band-Aid.     The area should heal within 7-10 days and may leave behind a pink or lighter color.     Do not use an antibiotic or Neosporin ointment.     You may take acetaminophen (Tylenol) for pain.     Call your Doctor if you have:    Severe pain    Signs of infection (warmth, redness, cloudy yellow drainage, and or a bad smell)    Questions or concerns    Who should I call with questions?       [facility]: [phone]       [facility]: [phone]       For urgent needs outside of business hours call the [facility] at [phone]        and ask for the dermatology resident on call                  Follow-ups after your visit        Follow-up notes from your care team     Return in about 8 months (around 7/22/2018) for AND FOR STICH REMOVALL.       Who to contact     Please call your clinic at [phone] to:    Ask questions about your health    Make or cancel appointments    Discuss your medicines    Learn about your test results    Speak to your doctor   If you have compliments or concerns about an experience at your clinic, or if you wish to file a complaint, please contact [facility] Patient Relations at [phone] or email us at [email or web address]         Additional Information About Your Visit        [employer] Information     [employer] is an electronic gateway that provides easy, online access to your medical records. With [employer], you can request a clinic appointment, read your test results, renew a prescription or communicate with your care team.     To sign up for [employer] visit the website at [email or web address]   You will be asked to enter the access code listed below, as well as some personal information. Please follow the directions to create your username and password.     Your access code is: [email or web address]  Expires: 2018  6:30 AM     Your access code will  in 90 days. If you need help or a new code, please contact your [facility] Clinic or call [phone] for assistance.        Care EveryWhere ID     This is your Care EveryWhere ID. This could be used by other organizations to access your [place] medical records  [id number]         Blood Pressure from Last 3 Encounters:   02/10/17 121/77   17 130/78   14 142/78    Weight from Last 3 Encounters:   02/10/17 68.8 kg (151 lb 9.6 oz)   17 69.6 kg (153 lb 6.4 oz)   14 69.6 kg (153 lb 6.4 oz)              We Performed the Following     BIOPSY SKIN/SUBQ/MUC MEM, EACH ADDTL LESION     BIOPSY SKIN/SUBQ/MUC MEM, SINGLE LESION     DESTRUCT PREMALIGNANT LESION, FIRST     Surgical pathology exam        Primary Care Provider Office Phone # Fax #    Maranda Mcmillan [phone]       XXX  RESIGNED XXX  [facility] [address]        Equal Access to Services     SOSA HERRERA : Hadii aad ku hadjosé migueliker Eastonjamesonali, waricardoda meaghankimberleyha, qamarcelinata scarrivkaaicha maguirenathanaelarash cortes. So [facility] [phone].    ATENCIÓN: Si habla español, tiene a chavez disposición servicios gratuitos de asistencia lingüística. [facility] [phone].    We comply with applicable federal civil rights laws and [place] laws. We do not discriminate on the basis of race, color, national origin, age, disability, sex, sexual orientation, or gender identity.            Thank you!     Thank you for choosing [facility]  for your care. Our goal is always to provide you with excellent care. Hearing back from our patients is one way we can continue to improve our services. Please take a few minutes to complete the written survey that you may receive in the mail after your visit with us. Thank you!             Your Updated Medication List - Protect others around you: Learn how to safely use, store and throw away your medicines at www.disposemymeds.org.          This list is accurate as of: 11/22/17  9:44 AM.  Always use your most recent med list.                   Brand Name Dispense Instructions for use Diagnosis    amoxicillin 500 MG capsule    AMOXIL    30 capsule    Take 1 capsule (500 mg) by mouth 3 times daily    Subacute maxillary sinusitis       amoxicillin-clavulanate 875-125 MG per tablet    AUGMENTIN    20 tablet    Take 1 tablet by mouth 2 times daily    Subacute maxillary sinusitis       fluticasone 50 MCG/ACT spray    FLONASE    1 Bottle    Spray 1-2 sprays into both nostrils daily    Cough, Post-nasal drip       IBUPROFEN PO      Take 2 tablets by mouth as needed

## 2017-11-22 NOTE — NURSING NOTE
"Dermatology Rooming Note    Lou Chaudhary's goals for this visit include:   Chief Complaint   Patient presents with     Skin Check     Skin check, Lou states \" I have a little spot on my nose and maybe one behind my ear.\"     Maria Dolores Moody LPN  "

## 2017-11-23 NOTE — PROGRESS NOTES
"[facility] Dermatology Note      Dermatology Problem List:  1. Malignant melanoma in situ, right dorsal forearm  -Stage IA  -Breslow's 0.42m  -no mites, nonulcerated  -s/p WLE 12/8/2015    2. Actinic keratosis  -s/p cryotherapy    3. Mildly dysplastic junctional nevus, lower back  -s/p excision 11/27/2015    4. Psoriasiform dermatitis, right earlobe  -s/p biopsy 1//2016  -No current flare or Tx    Encounter Date: Nov 22, 2017    CC:  Chief Complaint   Patient presents with     Skin Check     Skin check, Lou states \" I have a little spot on my nose and maybe one behind my ear.\"         History of Present Illness:  Ms. Lou Chaudhary is a 62 year old female who was last seen here in Dermatology 12/07/2016. She presents as a follow-up skin check for history of melanoma, actinic keratosis, and dysplastic nevus, please refer to Dermatology problem list. Today, the patient states that she has a small rough spot on her nose still remaining since the last cryotherpy. She also has a bump behind her R ear that has been present for a month, and is completely asymptomatic. Additionally, there are two spots in between her breasts that been unchanged for years, but are slightly itchy. She requests they be removed today. Otherwise, she hasn't noticed any skin changes. She has been generally feeling well since her last visit. She is receiving regular eye examinations.    Past Medical History:   Patient Active Problem List   Diagnosis     History of melanoma     Idiopathic scoliosis     Hematochezia     Benign neoplasm of rectum     History of dysplastic nevus     Past Medical History:   Diagnosis Date     Malignant melanoma (H)      Scoliosis      Past Surgical History:   Procedure Laterality Date     BIOPSY OF SKIN LESION       MOHS MICROGRAPHIC PROCEDURE         Social History:  The patient works as a  and , she is retired from the [employer]. The patient admits to use of tanning beds in the " past.    Family History:  There is no family history of skin cancer. and There is no family history of melanoma.    Medications:  Current Outpatient Prescriptions   Medication Sig Dispense Refill     amoxicillin-clavulanate (AUGMENTIN) 875-125 MG per tablet Take 1 tablet by mouth 2 times daily (Patient not taking: Reported on 11/22/2017) 20 tablet 0     amoxicillin (AMOXIL) 500 MG capsule Take 1 capsule (500 mg) by mouth 3 times daily (Patient not taking: Reported on 11/22/2017) 30 capsule 0     fluticasone (FLONASE) 50 MCG/ACT spray Spray 1-2 sprays into both nostrils daily (Patient not taking: Reported on 11/22/2017) 1 Bottle 11     IBUPROFEN PO Take 2 tablets by mouth as needed       No Known Allergies      Review of Systems:  -A focused ROS was completed and revealed no abnormalities.  -Constitutional: The patient denies fatigue, fevers, chills, unintended weight loss.  -Skin: As above in HPI. No additional skin concerns.    Physical exam:  Vitals: There were no vitals taken for this visit.  GEN: This is a well developed, well-nourished female in no acute distress, in a pleasant mood.    SKIN: Total skin including the undergarment areas was performed. The exam included the head/face, neck, both arms, chest, back, abdomen, both legs, digits and/or nails, buttocks, and genitalia.   -There is a 1 mm hyperkeratotic macule over the nasal bridge consistent with an actinic keratosis.  -There is a well circumscribed slightly pearly flat topped flesh colored papule with faint surrounding erythema over the R posterior auricular skin consistent with inflamed seborrhoic keratosis vs. basal cell carcinoma.  -There are well healed surgical scars without hyperpigmentation, erythema, nodularity or telangiectasias on the R dorsal forearm and lower back.   -There are waxy stuck on tan to brown papules in the L inframammary fold consistent with seborrheic keratoses.  -Over the central chest and central abdomen near the subcostal  margin, there are 2 well circumscribed soft flesh colored to mildly erythematous papules consistent with benign nevi.  -There are areas of slightly hypopigmented and hyperpigmented red brown macules with some follicular prominence over the neck with a few faint telangiectias consistent with mild atrophy and poikiloderma.   LYMPH: No lymphadenopathy in cervical, axillary, inguinal, or popliteal region.  -Normal oral exam.  -There is a well healed surgical scar without hyperpigmentation, erythema, nodularity or telangiectasias on the R dorsal forearm in the site of prior melanoma excision.  -normal don exam  -No other lesions of concern on areas examined.     Impression/Plan:    1. Actinic keratosis, nasal bridge   Cryotherapy procedure note: After verbal consent and discussion of risks and benefits including but no limited to dyspigmentation/scar, blister, and pain, 1 AK was(were) treated with 1-2mm freeze border for 1 cycle with liquid nitrogen. Post cryotherapy instructions were provided.     2.    Neoplasm of uncertain behavior of skin, central chest, abdomen    Punch biopsy(performed by faculty): After discussion of benefits and risks including but not limited to bleeding, infection, scar, incomplete removal, recurrence, and non-diagnostic biopsy, written consent and photographs were obtained. Time-out was performed. The area was cleaned with isopropyl alcohol. 1% plain lidocaine was injected to obtain adequate anesthesia of the lesions. Two 3 mm punch biopsies were performed.  4-0 prolene sutures were utilized to approximate the epidermal edges.  White petroleum jelly/VaselineTM and a bandage was applied to the wound.  Explicit verbal and written wound care instructions were provided.  The patient left the Dermatology Clinic in good condition.     3.   Neoplasm of uncertain behavior of skin, R posterior auricular. DDX-Inflamed seborrhoic keratosis vs. basal cell carcinoma.    After discussion of benefits and  risks including but not limited to bleeding, infection, scar, incomplete removal, recurrence, and non-diagnostic biopsy, written consent and photographs were obtained. The area was cleaned with isopropyl alcohol. 1% plain lidocaine was injected to obtain adequate anesthesia of the lesion on the R posterior auricular skin. A shave biopsy was performed. Hemostasis was achieved with aluminium chloride. Vaseline and a sterile dressing were applied. The patient tolerated the procedure and no complications were noted. The patient was provided with verbal and written post care instructions.     4.    Skin exam in the setting of melanoma Hx, R forearm    Sun precaution was advised including the use of sun screens of SPF 30 or higher, sun protective clothing, and avoidance of tanning beds.    5. Poikiloderma bothersome    Hydroquinone 6 weeks prior to laser, stop 1 week prior, then combination PDL and clear and brilliant X 3-4 rounds.   6. Multiple benign nevi  Follow-up in 8 months or sooner if new or changing lesions and in cosmetics if needed  Stitch removal 1-2 weeks.     Staff Involved:  Scribed by MIGUEL ÁNGEL Guajardo for Dr. Grijalva.        Ashley Grijalva MD    Department of Dermatology  [facility]: Phone: [phone], Fax:[phone]  [facility]: Phone: [phone], Fax: [phone]

## 2017-11-27 LAB — COPATH REPORT: NORMAL

## 2017-11-28 ENCOUNTER — TELEPHONE (OUTPATIENT)
Dept: DERMATOLOGY | Facility: CLINIC | Age: 62
End: 2017-11-28

## 2017-11-29 ENCOUNTER — OFFICE VISIT (OUTPATIENT)
Dept: DERMATOLOGY | Facility: CLINIC | Age: 62
End: 2017-11-29

## 2017-11-29 DIAGNOSIS — C44.212 BASAL CELL CARCINOMA OF RIGHT POSTAURICULAR REGION: Primary | ICD-10-CM

## 2017-11-29 ASSESSMENT — PAIN SCALES - GENERAL: PAINLEVEL: NO PAIN (0)

## 2017-11-29 NOTE — NURSING NOTE
Chief Complaint   Patient presents with     Derm Problem     Lou is here for a consult on the rright postauricular due to BCC       History of Skin cancer : yes    History of Mohs Surgery: no    History of a solid organ transplant: no Organ(s): no Year(s): no Creatinine: no Bone Marrow Transplant : no (year, no)    Immunosuppressive Medications: no (if yes, which ones: )    HIV or Hepatitis B or C : no    Chronic lymphocytic leukemia: no    Diabetes: no (Type 1 or 2: no)    Any Bleeding disorders: no    Do you have a Pacemaker or Defibrillator: no (year placed: )    Artificial heart valve: no (mechanical or porcine: )    Joint Replacement in the last 2 years: yes Joint(s): knee Year(s): 2015    Do you typically take Prophylactic Antibiotics before seeing a dentist or having a procedure?: no    If yes, verify that patient has the antibiotic on hand and instruct to take one hour before their surgery appointment.    If they do not have the antibiotic, verify the patients pharmacy and notify Dr. Aguilar by printing the pre-mohs call sheet.    Do you wear a C Pap Mask: no    If yes, and procedure is on the face, ask patient to bring in the mask with them (Mask only)    Do you have any mobility issues: nop    If yes, is a van service is required to transport you to/from your appointment? no    Smoking History (tobacco of any sort): 50 years ago    Do you have any other health issues that we should know about? no    Do you take any of the following Blood Thinners:    Aspirin: no    Plavix/Aggrastat/Brilinta: no    Warfarin: no (Last INR: no Date: no)    Pradaxa/Eliquis/Xarelto: no    Ibuprofen (Advil/Motrin): no    Naproxen (Aleve): no    Vitamin E: no    Fish Oil: no    Ginkgo biloba: no    Other:no    donePaient was instructed of the following: Ibuprofen, naproxen, Vitamin E, Fish Oil, and Ginkgo biloba should be stopped 1 week prior to and 1 week after the procedure.    Medication Allergies: no    Are medications in  Epic: none    donePatient was reminded to take all medications as usual, other than those listed above, on the day of surgery    donePatient was instructed to bring all medications to clinic on day of surgery    donePatient will bring a     (A  is helpful for the following reasons: some patients need medications to relax, but once given, patients should not drive; sometimes bandages obstruct your vision making it difficult to see and unsafe to drive; the day can get long so it s nice to have a amber; sometimes the procedure wears people out/makes them quite tired)    donePhotograph of the surgical site(s) is/are available    donePatient was reminded that this can be an all-day procedure and that no other appointments should be scheduled on the day of Mohs

## 2017-11-29 NOTE — PATIENT INSTRUCTIONS
It was a pleasure to see you today in clinic.     Mohs Cutaneous Micrographic Surgery Unit  Moshe YañezDO    Schedule Mohs procedure - Basal Cell Carcinoma right post auricular.     Pre-Surgical Instruction Sheet    1. Expect to be here majority of the morning.  The Mohs surgical procedure can be an extensive surgery requiring multiple stages. Each stage may take 1-2 hours.  ? You may eat breakfast  ? You may bring snacks or beverages with you  ? Take all normal medications morning of surgery -- unless otherwise indicated by your physician  ? If you have an artificial heart valve, or joint replacement within two years, we will prescribe an antibiotic. Please take one hour prior to surgery.    2. You will need a  to be with you if your surgical site is close to your eyes. You can get swelling/bruising immediately after surgery and will go home with a big bulky bandage that could obstruct your view of driving safely.    3. Wear comfortable clothing -- preferably a button down shirt or a loose fitted shirt. (to avoid pulling over pressure bandage off when you get home) If your surgery site is on your leg, try wearing loose fitted pants. If your surgery site is on your arm, try wearing a short-sleeve shirt.    4. Bring reading material or other items to help pass time. We do have internet access available if you own a laptop, iPad, etc.)    5. Women - do NOT wear make-up. We will be washing it off anyone needing surgery on the face    6. Do NOT stop blood thinning medication unless instructed to do so by your surgeon. This will include: Warfarin, Coumadin, Jantoven, Aspirin, Plavix and Pradaxa. If you are taking Warfarin or Coumadin, please have your INR blood test results sent to our office no more than 7 days prior to your surgery.     7. Tylenol is a good alternative to take for headaches and pain, and can be taken throughout your surgery and postoperative recovery.    8. If your surgery is on your trunk,  arms, hands, legs, feet, fingernail or a toenail, please wash the area with Hibiclens, an over-the-counter antiseptic soap, the night before and morning of your surgery.

## 2017-11-29 NOTE — LETTER
11/29/2017       RE: Lou Chaudhary  [address] [facility] [address]     Dear Colleague,    Thank you for referring your patient, Lou Chaudhary, to the [facility] DERMATOLOGIC SURGERY at [facility]. Please see a copy of my visit note below.    [facility] Dermatology Note    Dermatology Problem List:  1. Malignant melanoma in situ, right dorsal forearm  -Stage IA  -Breslow's 0.42m  -no mites, nonulcerated  -s/p WLE 12/8/2015     2. Basal Cell Carcinoma right post auricular 11/22/17    3.  Actinic keratosis  -s/p cryotherapy     4. Mildly dysplastic junctional nevus, lower back  -s/p excision 11/27/2015     5. Psoriasiform dermatitis, right earlobe  -s/p biopsy 1//2016  -No current flare or Tx    CC:   Chief Complaint   Patient presents with     Derm Problem     Lou is here for a consult on the right postauricular due to BCC     Encounter Date: Nov 29, 2017    History of Present Illness:  Ms. Lou Chaudhary is a 62 year old female who present for pre-Mohs consultation for BCC on right post auricular scalp.     The papule was noticed by Dr. Grijalva. She is concerned it might be large.   It has been asymptomatic following the biopsy.     Past Medical History:   Patient Active Problem List   Diagnosis     History of melanoma     Idiopathic scoliosis     Hematochezia     Benign neoplasm of rectum     History of dysplastic nevus     Past Medical History:   Diagnosis Date     Malignant melanoma (H)      Scoliosis      Past Surgical History:   Procedure Laterality Date     BIOPSY OF SKIN LESION       MOHS MICROGRAPHIC PROCEDURE         Medications:  Current Outpatient Prescriptions   Medication Sig Dispense Refill     IBUPROFEN PO Take 2 tablets by mouth as needed       No Known Allergies      Review of Systems:  -Constitutional: The patient denies fatigue, fevers, chills, unintended weight loss, and night sweats.  -Skin: As above in HPI. No additional skin  concerns.    Physical exam:  Vitals: There were no vitals taken for this visit.  GEN: This is a well developed, well-nourished female in no acute distress, in a pleasant mood.    SKIN: Focused examination of the right post auricular scalp was performed.  - pink macule with superficial scale; 0.9x0.8cm  - No other lesions of concern on areas examined.     Impression/Plan:  1. Basal Cell Carcinoma; right retroauricular scalp.     AUC Score = 8; tumor size and location.     The nature, risks, benefits, and alternatives to Mohs surgery were discussed. The patient would like to proceed with Mohs surgery.    No indications for pre-op antibiotics. Joint replacement was >2 years ago.      Not taking blood thinners.     Repair likely purse string vs linear repair.     Staff Involved:  Staff Only    Moshe Yañez DO    Department of Dermatology  [facility]: Phone: [phone], Fax:[phone]  [facility]: Phone: [phone], Fax: [phone]

## 2017-11-29 NOTE — MR AVS SNAPSHOT
After Visit Summary   11/29/2017    Lou Chaudhary    MRN: [id number]           Patient Information     Date Of Birth          1955        Visit Information        Provider Department      11/29/2017 11:15 AM Moshe Yañez MD [facility] Dermatologic Surgery        Care Instructions      It was a pleasure to see you today in clinic.     Mohs Cutaneous Micrographic Surgery Unit  Moshe Yañez DO    Schedule Mohs procedure - Basal Cell Carcinoma right post auricular.     Pre-Surgical Instruction Sheet    1. Expect to be here majority of the morning.  The Mohs surgical procedure can be an extensive surgery requiring multiple stages. Each stage may take 1-2 hours.  ? You may eat breakfast  ? You may bring snacks or beverages with you  ? Take all normal medications morning of surgery -- unless otherwise indicated by your physician  ? If you have an artificial heart valve, or joint replacement within two years, we will prescribe an antibiotic. Please take one hour prior to surgery.    2. You will need a  to be with you if your surgical site is close to your eyes. You can get swelling/bruising immediately after surgery and will go home with a big bulky bandage that could obstruct your view of driving safely.    3. Wear comfortable clothing -- preferably a button down shirt or a loose fitted shirt. (to avoid pulling over pressure bandage off when you get home) If your surgery site is on your leg, try wearing loose fitted pants. If your surgery site is on your arm, try wearing a short-sleeve shirt.    4. Bring reading material or other items to help pass time. We do have internet access available if you own a laptop, iPad, etc.)    5. Women - do NOT wear make-up. We will be washing it off anyone needing surgery on the face    6. Do NOT stop blood thinning medication unless instructed to do so by your surgeon. This will include: Warfarin, Coumadin, Jantoven, Aspirin, Plavix and Pradaxa. If you are taking  Warfarin or Coumadin, please have your INR blood test results sent to our office no more than 7 days prior to your surgery.     7. Tylenol is a good alternative to take for headaches and pain, and can be taken throughout your surgery and postoperative recovery.    8. If your surgery is on your trunk, arms, hands, legs, feet, fingernail or a toenail, please wash the area with Hibiclens, an over-the-counter antiseptic soap, the night before and morning of your surgery.             Follow-ups after your visit        Your next 10 appointments already scheduled     Dec 05, 2017  9:30 AM CST   (Arrive by 9:15 AM)   New Mohs with Moshe Yañez MD   [facility] Dermatologic Surgery ([facility])    [address] [place] [address] [phone]              Who to contact     Please call your clinic at [phone] to:    Ask questions about your health    Make or cancel appointments    Discuss your medicines    Learn about your test results    Speak to your doctor   If you have compliments or concerns about an experience at your clinic, or if you wish to file a complaint, please contact [facility] Patient Relations at [phone] or email us at [email or web address]         Additional Information About Your Visit        [employer] Information     StudyCloud is an electronic gateway that provides easy, online access to your medical records. With StudyCloud, you can request a clinic appointment, read your test results, renew a prescription or communicate with your care team.     To sign up for [employer] visit the website at [email or web address]   You will be asked to enter the access code listed below, as well as some personal information. Please follow the directions to create your username and password.     Your access code is: [email or web address]  Expires: 2018  6:30 AM     Your access code will  in 90 days. If you need help or a new  code, please contact your [facility] or call [phone] for assistance.        Care EveryWhere ID     This is your Care EveryWhere ID. This could be used by other organizations to access your [place] medical records  [id number]         Blood Pressure from Last 3 Encounters:   02/10/17 121/77   02/06/17 130/78   03/19/14 142/78    Weight from Last 3 Encounters:   02/10/17 68.8 kg (151 lb 9.6 oz)   02/06/17 69.6 kg (153 lb 6.4 oz)   03/19/14 69.6 kg (153 lb 6.4 oz)              Today, you had the following     No orders found for display       Primary Care Provider Office Phone # Fax #    Danelle Mesa [phone]       [address] [address]        Equal Access to Services     [facility] : Hadii nadege Kerns, waaxda harjit, qaybta kaalmada francine, aicha valdez . So [facility] [phone].    ATENCIÓN: Si habla español, tiene a chavez disposición servicios gratuitos de asistencia lingüística. Jemalame al [phone].    We comply with applicable federal civil rights laws and [place] laws. We do not discriminate on the basis of race, color, national origin, age, disability, sex, sexual orientation, or gender identity.            Thank you!     Thank you for choosing [facility] DERMATOLOGIC SURGERY  for your care. Our goal is always to provide you with excellent care. Hearing back from our patients is one way we can continue to improve our services. Please take a few minutes to complete the written survey that you may receive in the mail after your visit with us. Thank you!             Your Updated Medication List - Protect others around you: Learn how to safely use, store and throw away your medicines at www.disposemymeds.org.          This list is accurate as of: 11/29/17 11:36 AM.  Always use your most recent med list.                   Brand Name Dispense Instructions for use Diagnosis    IBUPROFEN PO       Take 2 tablets by mouth as needed

## 2017-12-05 ENCOUNTER — OFFICE VISIT (OUTPATIENT)
Dept: DERMATOLOGY | Facility: CLINIC | Age: 62
End: 2017-12-05

## 2017-12-05 VITALS — SYSTOLIC BLOOD PRESSURE: 136 MMHG | HEART RATE: 73 BPM | DIASTOLIC BLOOD PRESSURE: 71 MMHG

## 2017-12-05 DIAGNOSIS — C44.41 BCC (BASAL CELL CARCINOMA), SCALP/NECK: Primary | ICD-10-CM

## 2017-12-05 ASSESSMENT — PAIN SCALES - GENERAL
PAINLEVEL: NO PAIN (0)
PAINLEVEL: NO PAIN (0)

## 2017-12-05 NOTE — NURSING NOTE
Closure performed on the right post auricular. Injected 6.0ml of Xylocaine  (Lidocaine 1% and Epinephrine  (1:100,000))    Defect Photo: AF  Closure Photo: AF    Present for procedure:     Dr. Otilio Salmeron Resident MD Michaela Yadav CMA

## 2017-12-05 NOTE — NURSING NOTE
1st layer performed on the right post auricular. Injected 3.0 ml of Xylocaine  (Lidocaine 1% and Epinephrine  (1:100,000))  LT      Present for procedure:    Dr. Otilio Salmeron Resident MD Michaela Yadav CMA

## 2017-12-05 NOTE — MR AVS SNAPSHOT
After Visit Summary   12/5/2017    Lou Chaudhary    MRN: [id number]           Patient Information     Date Of Birth          1955        Visit Information        Provider Department      12/5/2017 9:30 AM Moshe Yañez MD [facility] Dermatologic Surgery        Care Instructions    Sutured Wound Care  Caring for your skin after surgery:    After your surgery, a pressure bandage will be placed over the area that has stitches. This will prevent bleeding. Please follow these instructions over the next 1 to 2 weeks. Following this regimen will help to prevent complications as your wound heals.      No strenuous activity for 48 hours. Resume moderate activity in 48 hours. No heavy exercising until you are seen for follow up.    Take Tylenol one hour after surgery. Take Tylenol every 4 hours as needed and do not take any aspirin products for pain (continue taking aspirin if prescribed by your doctor to prevent heart attacks and strokes).    Do not drink alcoholic beverages for 48 hours.    No dietary restrictions.    Keep the pressure bandage in place for 24 hours. If the bandage becomes blood tinged or loose, reinforce it with gauze and tape.     Keep the bandage dry. Wash around it carefully    If the tape becomes soiled or starts to come off, reinforce it with additional paper tape.    Do not smoke for 3 weeks; smoking is detrimental to wound healing.    It is normal to have swelling and bruising around the surgical site. The bruising will fade in approximately 10-14 days. Elevate the area to reduce swelling.    Numbness, itchiness and sensitivity to temperature changes can occur after surgery and may take up to 18 months to normalize.  Remove the outer pressure bandage in 24-48 hours hours  Gently wash area with tap water.  Apply Vaseline and a non stick bandage to site daily   Follow up in 1 week.    Bleeding:  You may see a small amount of drainage or blood on your pressure dressing. This is normal  and the following instructions should be followed if the wound is bleeding saturates the dressing.    1. Leave the bandage in place.  2. Use tightly rolled up gauze or a cloth to apply direct pressure over the bandage for 20 minutes.  3. Reapply pressure for an additional 20 minutes if necessary.  4. Call the office or go to the nearest emergency room if pressure fails to stop the bleeding.  5. Use additional gauze and tape to maintain pressure once the bleeding has stopped.    Pain:  1. Post operative pain should slowly get better, never worse.  2. A severe increase in pain may indicate a problem. Call the office if this occurs.  3. You may use ice directly over the dressing, but make sure the dressing does not get wet.    Phone numbers:  During business hours (M-F 8:00-4:30 p.m.)  [facility]-  [phone] Option 1 appt. desk  [phone]  Option 3 nurse triage line  ---------------------------------------------------------  Evenings/Weekends/Holidays  Hospital - [phone]   TTY for hearing ovcksdbg-[phone]  *Ask  to page dermatologist on-call  Emergency Drgp-[phone]  TTY for hearing impaired- [phone]            Follow-ups after your visit        Your next 10 appointments already scheduled     Dec 12, 2017  2:15 PM CST   (Arrive by 2:00 PM)   Post-Op with Moshe Yañez MD   [facility] Dermatologic Surgery ([facility])    [address] [place] [address] [phone]              Who to contact     Please call your clinic at [phone] to:    Ask questions about your health    Make or cancel appointments    Discuss your medicines    Learn about your test results    Speak to your doctor   If you have compliments or concerns about an experience at your clinic, or if you wish to file a complaint, please contact [facility] Patient Relations at [phone] or email us at  [email or web address]         Additional Information About Your Visit        [employer] Information     Gremlnhart is an electronic gateway that provides easy, online access to your medical records. With Velteo, you can request a clinic appointment, read your test results, renew a prescription or communicate with your care team.     To sign up for Vel[employer]o visit the website at [email or web address]   You will be asked to enter the access code listed below, as well as some personal information. Please follow the directions to create your username and password.     Your access code is: [email or web address]  Expires: 2018  6:30 AM     Your access code will  in 90 days. If you need help or a new code, please contact your [facility] or call [phone] for assistance.        Care EveryWhere ID     This is your Care EveryWhere ID. This could be used by other organizations to access your [place] medical records  [id number]        Your Vitals Were     Pulse                   73            Blood Pressure from Last 3 Encounters:   17 136/71   02/10/17 121/77   17 130/78    Weight from Last 3 Encounters:   02/10/17 68.8 kg (151 lb 9.6 oz)   17 69.6 kg (153 lb 6.4 oz)   14 69.6 kg (153 lb 6.4 oz)              Today, you had the following     No orders found for display       Primary Care Provider Office Phone # Fax #    Danelle Mesa [phone]       [address] [address]        Equal Access to Services     [facility] : Hadii aad sabi hadasho Soomaali, waaxda luqadaha, qaybta kaalmada francine, aicha valdez . So [facility] [phone].    ATENCIÓN: Si habla español, tiene a chavez disposición servicios gratuitos de asistencia lingüística. Llame al [phone].    We comply with applicable federal civil rights laws and [place] laws. We do not discriminate on the basis of race, color,  national origin, age, disability, sex, sexual orientation, or gender identity.            Thank you!     Thank you for choosing [facility] DERMATOLOGIC SURGERY  for your care. Our goal is always to provide you with excellent care. Hearing back from our patients is one way we can continue to improve our services. Please take a few minutes to complete the written survey that you may receive in the mail after your visit with us. Thank you!             Your Updated Medication List - Protect others around you: Learn how to safely use, store and throw away your medicines at www.disposemymeds.org.          This list is accurate as of: 12/5/17  1:13 PM.  Always use your most recent med list.                   Brand Name Dispense Instructions for use Diagnosis    IBUPROFEN PO      Take 2 tablets by mouth as needed

## 2017-12-05 NOTE — PROGRESS NOTES
MOHS MICROGRAPHIC SURGERY REPORT   Dec 5, 2017    Surgeon: Moshe Yañez DO  Resident: Dane Salmeron MD    INDICATION:    Preoperative Diagnosis: BCC  Location: right post-auricular scalp  Postoperative Diagnosis: Same  Preoperative Lesion size: 0.9x0.8cm    After appropriate discussion and informed consent for Mohs surgery and possible repair of the Mohs surgery defect, the patient underwent Mohs surgery as follows:    STAGE I:  The patient was placed on the operating room table.  The area was cleansed with chlorhexidine and infiltrated with 1% Lidocaine and epinephrine. Tumor was debulked. Using a #15-blade, complete excision was made around the tumor in 1 section.  Hemostasis was obtained by electrodesiccation.  A dressing was placed.  Tissue was kept as 1 tissue block that was subsequently mapped, color coded and processed in the Mohs Laboratory.  Microscopic tumor was not found in any of the tissue.    With the lesion clear of micrographic tumor, surgery was considered complete.  The defect extended to the fascia and measured 1.8x1.6cm.    RECONSTRUCTIVE DERMATOLOGIC SURGERY REPORT  We discussed the options for wound management in full with the patient including risks/benefits/possible outcomes.     REPAIR: An intermediate layered linear closure was selected as the procedure which would maximally preserve both function and cosmesis.    After the excision of the tumor, the area was carefully undermined. Hemostasis was obtained with  electrocoagulation.  Closure was oriented so that the wound was in the patient's natural skin tension lines. The subcutaneous and dermal layers were then closed with 4-0 monocryl buried vertical mattress sutures. The epidermis was then carefully approximated along the length of the wound using 5-0 FAG simple running sutures.     The final wound length was 4.0 cm. A total of 9 ml of anesthesia was administered for all surgical sites. Estimated blood loss was less than 10 ml for  all surgical sites. A sterile pressure dressing was applied and wound care instructions, with a written handout, were given. The patient was discharged from the Dermatologic Surgery Center alert and ambulatory.    Dr. Yañez performed and was physically present for the entire surgery.      Staff Involved:  Resident(Dane Salmeron)/Staff(as above)    I have seen, examined, and discussed the patient with Dr. Stark. I agree with his description of the surgery as above.   I personally performed the procedures today.    Moshe Yañez DO    Department of Dermatology  [facility]: Phone: [phone], Fax:[phone]  [facility]: Phone: [phone], Fax: [phone]                          Picture placed in chart for future reference.

## 2017-12-05 NOTE — PATIENT INSTRUCTIONS
Sutured Wound Care  Caring for your skin after surgery:    After your surgery, a pressure bandage will be placed over the area that has stitches. This will prevent bleeding. Please follow these instructions over the next 1 to 2 weeks. Following this regimen will help to prevent complications as your wound heals.      No strenuous activity for 48 hours. Resume moderate activity in 48 hours. No heavy exercising until you are seen for follow up.    Take Tylenol one hour after surgery. Take Tylenol every 4 hours as needed and do not take any aspirin products for pain (continue taking aspirin if prescribed by your doctor to prevent heart attacks and strokes).    Do not drink alcoholic beverages for 48 hours.    No dietary restrictions.    Keep the pressure bandage in place for 24 hours. If the bandage becomes blood tinged or loose, reinforce it with gauze and tape.     Keep the bandage dry. Wash around it carefully    If the tape becomes soiled or starts to come off, reinforce it with additional paper tape.    Do not smoke for 3 weeks; smoking is detrimental to wound healing.    It is normal to have swelling and bruising around the surgical site. The bruising will fade in approximately 10-14 days. Elevate the area to reduce swelling.    Numbness, itchiness and sensitivity to temperature changes can occur after surgery and may take up to 18 months to normalize.  Remove the outer pressure bandage in 24-48 hours hours  Gently wash area with tap water.  Apply Vaseline and a non stick bandage to site daily   Follow up in 1 week.    Bleeding:  You may see a small amount of drainage or blood on your pressure dressing. This is normal and the following instructions should be followed if the wound is bleeding saturates the dressing.    1. Leave the bandage in place.  2. Use tightly rolled up gauze or a cloth to apply direct pressure over the bandage for 20 minutes.  3. Reapply pressure for an additional 20 minutes if  necessary.  4. Call the office or go to the nearest emergency room if pressure fails to stop the bleeding.  5. Use additional gauze and tape to maintain pressure once the bleeding has stopped.    Pain:  1. Post operative pain should slowly get better, never worse.  2. A severe increase in pain may indicate a problem. Call the office if this occurs.  3. You may use ice directly over the dressing, but make sure the dressing does not get wet.    Phone numbers:  During business hours (M-F 8:00-4:30 p.m.)  [facility]-  [phone] Option 1 appt. desk  [phone]  Option 3 nurse triage line  ---------------------------------------------------------  Evenings/Weekends/Holidays  Hospital - [phone]   TTY for hearing asciesyn-[phone]  *Ask  to page dermatologist on-call  Emergency Wefg-[phone]  TTY for hearing impaired- [phone]

## 2017-12-05 NOTE — NURSING NOTE
Suture site dressed with vaseline, telfa and paper tape. Dental rolls, 2x2's and micropore tape applied for pressure. Instructions verbally gone over with patient and all questions answered. Patient denied having any pain.    Michaela Yadav CMA

## 2017-12-05 NOTE — LETTER
12/5/2017       RE: Lou Chaudhary  [address] [facility] [address]     Dear Colleague,    Thank you for referring your patient, Lou Chaudhary, to the [facility] DERMATOLOGIC SURGERY at [facility]. Please see a copy of my visit note below.    MOHS MICROGRAPHIC SURGERY REPORT   Dec 5, 2017    Surgeon: Moshe Yañez DO  Resident: Dane Salmeron MD    INDICATION:    Preoperative Diagnosis: BCC  Location: right post-auricular scalp  Postoperative Diagnosis: Same  Preoperative Lesion size: 0.9x0.8cm    After appropriate discussion and informed consent for Mohs surgery and possible repair of the Mohs surgery defect, the patient underwent Mohs surgery as follows:    STAGE I:  The patient was placed on the operating room table.  The area was cleansed with chlorhexidine and infiltrated with 1% Lidocaine and epinephrine. Tumor was debulked. Using a #15-blade, complete excision was made around the tumor in 1 section.  Hemostasis was obtained by electrodesiccation.  A dressing was placed.  Tissue was kept as 1 tissue block that was subsequently mapped, color coded and processed in the Mohs Laboratory.  Microscopic tumor was not found in any of the tissue.    With the lesion clear of micrographic tumor, surgery was considered complete.  The defect extended to the fascia and measured 1.8x1.6cm.    RECONSTRUCTIVE DERMATOLOGIC SURGERY REPORT  We discussed the options for wound management in full with the patient including risks/benefits/possible outcomes.     REPAIR: An intermediate layered linear closure was selected as the procedure which would maximally preserve both function and cosmesis.    After the excision of the tumor, the area was carefully undermined. Hemostasis was obtained with  electrocoagulation.  Closure was oriented so that the wound was in the patient's natural skin tension lines. The subcutaneous and dermal layers were then closed with 4-0 monocryl buried vertical  mattress sutures. The epidermis was then carefully approximated along the length of the wound using 5-0 FAG simple running sutures.     The final wound length was 4.0 cm. A total of 9 ml of anesthesia was administered for all surgical sites. Estimated blood loss was less than 10 ml for all surgical sites. A sterile pressure dressing was applied and wound care instructions, with a written handout, were given. The patient was [facility] alert and ambulatory.    Dr. Yañez performed and was physically present for the entire surgery.      Staff Involved:  Resident(Dane Salmeron)/Staff(as above)    I have seen, examined, and discussed the patient with Dr. Stark. I agree with his description of the surgery as above.   I personally performed the procedures today.    Moshe Yañez DO    Department of Dermatology  [facility]: Phone: [phone], Fax:[phone]  [facility]: Phone: [phone], Fax: [phone]                          Picture placed in chart for future reference.       Again, thank you for allowing me to participate in the care of your patient.      Sincerely,    Moshe Yañez MD

## 2017-12-08 NOTE — PROGRESS NOTES
[facility] Dermatology Note    Dermatology Problem List:  1. Malignant melanoma in situ, right dorsal forearm  -Stage IA  -Breslow's 0.42m  -no mites, nonulcerated  -s/p WLE 12/8/2015     2. Basal Cell Carcinoma right post auricular 11/22/17    3.  Actinic keratosis  -s/p cryotherapy     4. Mildly dysplastic junctional nevus, lower back  -s/p excision 11/27/2015     5. Psoriasiform dermatitis, right earlobe  -s/p biopsy 1//2016  -No current flare or Tx    CC:   Chief Complaint   Patient presents with     Derm Problem     Lou is here for a consult on the right postauricular due to BCC     Encounter Date: Nov 29, 2017    History of Present Illness:  Ms. Lou Chaudhary is a 62 year old female who present for pre-Mohs consultation for BCC on right post auricular scalp.     The papule was noticed by Dr. Grijalva. She is concerned it might be large.   It has been asymptomatic following the biopsy.     Past Medical History:   Patient Active Problem List   Diagnosis     History of melanoma     Idiopathic scoliosis     Hematochezia     Benign neoplasm of rectum     History of dysplastic nevus     Past Medical History:   Diagnosis Date     Malignant melanoma (H)      Scoliosis      Past Surgical History:   Procedure Laterality Date     BIOPSY OF SKIN LESION       MOHS MICROGRAPHIC PROCEDURE         Medications:  Current Outpatient Prescriptions   Medication Sig Dispense Refill     IBUPROFEN PO Take 2 tablets by mouth as needed       No Known Allergies      Review of Systems:  -Constitutional: The patient denies fatigue, fevers, chills, unintended weight loss, and night sweats.  -Skin: As above in HPI. No additional skin concerns.    Physical exam:  Vitals: There were no vitals taken for this visit.  GEN: This is a well developed, well-nourished female in no acute distress, in a pleasant mood.    SKIN: Focused examination of the right post auricular scalp was performed.  - pink macule with superficial scale;  0.9x0.8cm  - No other lesions of concern on areas examined.     Impression/Plan:  1. Basal Cell Carcinoma; right retroauricular scalp.     AUC Score = 8; tumor size and location.     The nature, risks, benefits, and alternatives to Mohs surgery were discussed. The patient would like to proceed with Mohs surgery.    No indications for pre-op antibiotics. Joint replacement was >2 years ago.      Not taking blood thinners.     Repair likely purse string vs linear repair.     Staff Involved:  Staff Only    Moshe Yañez DO    Department of Dermatology  [facility]: Phone: [phone], Fax:[phone]  [facility]: Phone: [phone], Fax: [phone]

## 2018-06-11 ENCOUNTER — OFFICE VISIT (OUTPATIENT)
Dept: DERMATOLOGY | Facility: CLINIC | Age: 63
End: 2018-06-11
Payer: COMMERCIAL

## 2018-06-11 DIAGNOSIS — L82.1 SK (SEBORRHEIC KERATOSIS): ICD-10-CM

## 2018-06-11 DIAGNOSIS — Z85.828 HISTORY OF SKIN CANCER: Primary | ICD-10-CM

## 2018-06-11 DIAGNOSIS — Z86.006 HISTORY OF MELANOMA IN SITU: ICD-10-CM

## 2018-06-11 RX ORDER — ACETAMINOPHEN 325 MG/1
325-650 TABLET ORAL PRN
COMMUNITY
Start: 2015-01-07

## 2018-06-11 ASSESSMENT — PAIN SCALES - GENERAL: PAINLEVEL: NO PAIN (0)

## 2018-06-11 NOTE — LETTER
6/11/2018       RE: Lou Chaudhary  [address] [facility] [address]     Dear Colleague,    Thank you for referring your patient, Lou Chaudhary, to the [facility] DERMATOLOGY at [facility]. Please see a copy of my visit note below.    [facility] Dermatology Note      Dermatology Problem List:  1. Malignant melanoma in situ, right dorsal forearm  -Stage IA  -Breslow's 0.42m  -no mites, nonulcerated  -s/p WLE 12/8/2015      2. Basal Cell Carcinoma right post auricular s/p Mohs 12/5/2017     3.  Actinic keratosis  -s/p cryotherapy      4. Mildly dysplastic junctional nevus, lower back  -s/p excision 11/27/2015      5. Psoriasiform dermatitis, right earlobe  -s/p biopsy 1//2016  -No current flare or Tx       CC:   Chief Complaint   Patient presents with     Derm Problem     Lou is here for a skin check, states no concerning areas today.          Encounter Date: Jun 11, 2018    History of Present Illness:  Ms. Lou Chaudhary is a 62 year old female who with a history of skin cancer presents for her 6 month FBSE.  She has a history of MIS of the right forearm in 2015 and most recently a BCC behind the right ear treated by Dr Yañez with Mohs in 2017.  She normally follows with Dr Grijalva but had trouble making an appointment with her.  She does not have any lesions of concern today.  She does think a brown spot on her right neck is more noticeable but not symptomatic.    Past Medical History:   Patient Active Problem List   Diagnosis     History of melanoma     Idiopathic scoliosis     Hematochezia     Benign neoplasm of rectum     History of dysplastic nevus     Past Medical History:   Diagnosis Date     Malignant melanoma (H)      Scoliosis      Past Surgical History:   Procedure Laterality Date     BIOPSY OF SKIN LESION       MOHS MICROGRAPHIC PROCEDURE               Medications:  Current Outpatient Prescriptions   Medication Sig Dispense Refill     acetaminophen  (TYLENOL) 325 MG tablet Take 325-650 mg by mouth as needed       IBUPROFEN PO Take 2 tablets by mouth as needed       No Known Allergies        Physical exam:  Vitals: There were no vitals taken for this visit.  GEN: This is a well developed, well-nourished female in no acute distress, in a pleasant mood.    SKIN: Total skin excluding the undergarment areas but including buttocks was performed. The exam included the head/face, neck, both arms, chest, back, abdomen, both legs, digits and/or nails.   -There are waxy stuck on tan to brown papules on the face,trunk, extremities and right neck.  -There is no erythema, telangectasias, nodularity, or pigmentation on the right forearm or right postauricular scar.  No cervical, submandibular, axillary or inguinal adenopathy by palpation.  -There are yellow oily papules with central umbilication located on the face.  -No other lesions of concern on areas examined.     Impression/Plan:  1. History of melanoma in situ of the right forearm, no clinical evidence of recurrence    We will clinically monitor this area.      2. History of nonmelanoma skin cancer, no clincial evidence of recurrence    We will clinically monitor this area.      3. Seborrheic keratosis, non irritated    reassurance        CC Dr. Mesa on close of this encounter.  Follow-up in 6 months, earlier for new or changing lesions.       Staff Involved:  Staff Only      Again, thank you for allowing me to participate in the care of your patient.      Sincerely,    Melinda Fierro MD

## 2018-06-11 NOTE — PROGRESS NOTES
[facility] Dermatology Note      Dermatology Problem List:  1. Malignant melanoma in situ, right dorsal forearm  -Stage IA  -Breslow's 0.42m  -no mites, nonulcerated  -s/p WLE 12/8/2015      2. Basal Cell Carcinoma right post auricular s/p Mohs 12/5/2017     3.  Actinic keratosis  -s/p cryotherapy      4. Mildly dysplastic junctional nevus, lower back  -s/p excision 11/27/2015      5. Psoriasiform dermatitis, right earlobe  -s/p biopsy 1//2016  -No current flare or Tx       CC:   Chief Complaint   Patient presents with     Derm Problem     Lou is here for a skin check, states no concerning areas today.          Encounter Date: Jun 11, 2018    History of Present Illness:  Ms. Lou Chaudhary is a 62 year old female who with a history of skin cancer presents for her 6 month FBSE.  She has a history of MIS of the right forearm in 2015 and most recently a BCC behind the right ear treated by Dr Yañez with Mohs in 2017.  She normally follows with Dr Grijalva but had trouble making an appointment with her.  She does not have any lesions of concern today.  She does think a brown spot on her right neck is more noticeable but not symptomatic.    Past Medical History:   Patient Active Problem List   Diagnosis     History of melanoma     Idiopathic scoliosis     Hematochezia     Benign neoplasm of rectum     History of dysplastic nevus     Past Medical History:   Diagnosis Date     Malignant melanoma (H)      Scoliosis      Past Surgical History:   Procedure Laterality Date     BIOPSY OF SKIN LESION       MOHS MICROGRAPHIC PROCEDURE               Medications:  Current Outpatient Prescriptions   Medication Sig Dispense Refill     acetaminophen (TYLENOL) 325 MG tablet Take 325-650 mg by mouth as needed       IBUPROFEN PO Take 2 tablets by mouth as needed       No Known Allergies        Physical exam:  Vitals: There were no vitals taken for this visit.  GEN: This is a well developed, well-nourished female in no  acute distress, in a pleasant mood.    SKIN: Total skin excluding the undergarment areas but including buttocks was performed. The exam included the head/face, neck, both arms, chest, back, abdomen, both legs, digits and/or nails.   -There are waxy stuck on tan to brown papules on the face,trunk, extremities and right neck.  -There is no erythema, telangectasias, nodularity, or pigmentation on the right forearm or right postauricular scar.  No cervical, submandibular, axillary or inguinal adenopathy by palpation.  -There are yellow oily papules with central umbilication located on the face.  -No other lesions of concern on areas examined.     Impression/Plan:  1. History of melanoma in situ of the right forearm, no clinical evidence of recurrence    We will clinically monitor this area.      2. History of nonmelanoma skin cancer, no clincial evidence of recurrence    We will clinically monitor this area.      3. Seborrheic keratosis, non irritated    reassurance        CC Dr. Mesa on close of this encounter.  Follow-up in 6 months, earlier for new or changing lesions.       Staff Involved:  Staff Only

## 2018-06-11 NOTE — MR AVS SNAPSHOT
After Visit Summary   6/11/2018    Lou Chaudhary    MRN: [id number]           Patient Information     Date Of Birth          1955        Visit Information        Provider Department      6/11/2018 10:45 AM Melinda Fierro MD [facility] Dermatology         Follow-ups after your visit        Who to contact     Please call your clinic at [phone] to:    Ask questions about your health    Make or cancel appointments    Discuss your medicines    Learn about your test results    Speak to your doctor            Additional Information About Your Visit        Care EveryWhere ID     This is your Care EveryWhere ID. This could be used by other organizations to access your [facility] medical records  [id number]         Blood Pressure from Last 3 Encounters:   12/05/17 136/71   02/10/17 121/77   02/06/17 130/78    Weight from Last 3 Encounters:   02/10/17 68.8 kg (151 lb 9.6 oz)   02/06/17 69.6 kg (153 lb 6.4 oz)   03/19/14 69.6 kg (153 lb 6.4 oz)              Today, you had the following     No orders found for display       Primary Care Provider Office Phone # Fax #    Danelle Mesa [phone]       [address] [address]        Equal Access to Services     SOSA HERRERA : Hadii nadege celestin hadjosé miguelo Sojamesonali, waaxda luqadaha, qaybta kaalmada adeegyada, aicha cortes. So [facility] [phone].    ATENCIÓN: Si habla español, tiene a chavez disposición servicios gratuitos de asistencia lingüística. Llame al [phone].    We comply with applicable federal civil rights laws and [place] laws. We do not discriminate on the basis of race, color, national origin, age, disability, sex, sexual orientation, or gender identity.            Thank you!     Thank you for choosing [facility] DERMATOLOGY  for your care. Our goal is always to provide you with excellent care. Hearing back from our patients is one way we can continue to improve our services.  Please take a few minutes to complete the written survey that you may receive in the mail after your visit with us. Thank you!             Your Updated Medication List - Protect others around you: Learn how to safely use, store and throw away your medicines at www.disposemymeds.org.          This list is accurate as of 6/11/18 11:07 AM.  Always use your most recent med list.                   Brand Name Dispense Instructions for use Diagnosis    acetaminophen 325 MG tablet    TYLENOL     Take 325-650 mg by mouth as needed        IBUPROFEN PO      Take 2 tablets by mouth as needed

## 2018-06-11 NOTE — NURSING NOTE
Dermatology Rooming Note    Lou Chaudhary's goals for this visit include:   Chief Complaint   Patient presents with     Derm Problem     Lou is here for a skin check, states no concerning areas today.      Ginger Nesbitt LPN

## 2018-08-13 ENCOUNTER — TRANSFERRED RECORDS (OUTPATIENT)
Dept: HEALTH INFORMATION MANAGEMENT | Facility: CLINIC | Age: 63
End: 2018-08-13

## 2018-08-14 LAB — MAMMOGRAM: NORMAL

## 2018-12-04 ENCOUNTER — PATIENT OUTREACH (OUTPATIENT)
Dept: CARE COORDINATION | Facility: CLINIC | Age: 63
End: 2018-12-04

## 2018-12-10 ENCOUNTER — OFFICE VISIT (OUTPATIENT)
Dept: DERMATOLOGY | Facility: CLINIC | Age: 63
End: 2018-12-10
Payer: COMMERCIAL

## 2018-12-10 DIAGNOSIS — L82.1 SK (SEBORRHEIC KERATOSIS): ICD-10-CM

## 2018-12-10 DIAGNOSIS — Z85.828 HISTORY OF SKIN CANCER: Primary | ICD-10-CM

## 2018-12-10 DIAGNOSIS — Z86.006 HISTORY OF MELANOMA IN SITU: ICD-10-CM

## 2018-12-10 ASSESSMENT — PAIN SCALES - GENERAL: PAINLEVEL: NO PAIN (0)

## 2018-12-10 NOTE — PROGRESS NOTES
[facility] Dermatology Note      Dermatology Problem List:  1. Malignant melanoma in situ, right dorsal forearm  -Stage IA  -Breslow's 0.42m  -no mites, nonulcerated  -s/p WLE 12/8/2015      2. Basal Cell Carcinoma right post auricular s/p Mohs 12/5/2017     3.  Actinic keratosis  -s/p cryotherapy      4. Mildly dysplastic junctional nevus, lower back  -s/p excision 11/27/2015      5. Psoriasiform dermatitis, right earlobe  -s/p biopsy 1//2016  -No current flare or Tx       CC:   Chief Complaint   Patient presents with     Derm Problem     Lou is here for a skin check, states no concerns today.          Encounter Date: Dec 10, 2018    History of Present Illness:  Ms. Lou Chaudhary is a 63 year old female who with a history of skin cancer presents for a FBSE.  No areas of concern.  She does have an URI and is not feeling well.  She will be travelling to [place] for a couple of months this winter.    Past Medical History:   Patient Active Problem List   Diagnosis     History of melanoma     Idiopathic scoliosis     Hematochezia     Benign neoplasm of rectum     History of dysplastic nevus     Past Medical History:   Diagnosis Date     Malignant melanoma (H)      Scoliosis      Past Surgical History:   Procedure Laterality Date     BIOPSY OF SKIN LESION       MOHS MICROGRAPHIC PROCEDURE         Social History:  Patient reports that she has quit smoking. she has never used smokeless tobacco. She reports that she drinks alcohol. She reports that she does not use drugs.    Family History:  Family History   Problem Relation Age of Onset     Diabetes Maternal Grandmother      Asthma Brother      Allergies Son      Skin Cancer Sister         BCC     Hearing Loss No family hx of        Medications:  Current Outpatient Medications   Medication Sig Dispense Refill     acetaminophen (TYLENOL) 325 MG tablet Take 325-650 mg by mouth as needed       IBUPROFEN PO Take 2 tablets by mouth as needed       No Known  Allergies          Physical exam:  Vitals: There were no vitals taken for this visit.  GEN: This is a well developed, well-nourished female in no acute distress, in a pleasant mood.    SKIN: Full skin, which includes the head/face, both arms, chest, back, abdomen,both legs, genitalia and/or groin buttocks, digits and/or nails, was examined.  -There is no erythema, telangectasias, nodularity, or pigmentation on the scars of the right forearm and right post auricular region..  -There are waxy stuck on tan to brown papules on the trunk and extremities.  -There are dome shaped bright red papules on the exam.   -Shotty cervical adenopathy but no submandibular, axillary or inguinal nodes  -No other lesions of concern on areas examined.     Impression/Plan:  1. History of melanoma in situ of the right forearm, no clinical evidence of recurrence    We will clinically monitor this area.      2. History of nonmelanoma skin cancer, no clincial evidence of recurrence    We will clinically monitor this area.      3. Seborrheic keratosis, non irritated    Reassurance        CC Referred Self, MD  No address on file on close of this encounter.  Follow-up in 6 months, earlier for new or changing lesions. Consider yearly after the next visit.      Staff Involved:  Staff Only

## 2018-12-10 NOTE — LETTER
12/10/2018       RE: Lou Chaudhary  [address] [facility] [address]     Dear Colleague,    Thank you for referring your patient, Lou Chaudhary, to the [facility] DERMATOLOGY at [facility]. Please see a copy of my visit note below.    [facility] Dermatology Note      Dermatology Problem List:  1. Malignant melanoma in situ, right dorsal forearm  -Stage IA  -Breslow's 0.42m  -no mites, nonulcerated  -s/p WLE 12/8/2015      2. Basal Cell Carcinoma right post auricular s/p Mohs 12/5/2017     3.  Actinic keratosis  -s/p cryotherapy      4. Mildly dysplastic junctional nevus, lower back  -s/p excision 11/27/2015      5. Psoriasiform dermatitis, right earlobe  -s/p biopsy 1//2016  -No current flare or Tx       CC:   Chief Complaint   Patient presents with     Derm Problem     Lou is here for a skin check, states no concerns today.          Encounter Date: Dec 10, 2018    History of Present Illness:  Ms. Lou Chaudhary is a 63 year old female who with a history of skin cancer presents for a FBSE.  No areas of concern.  She does have an URI and is not feeling well.  She will be travelling to [place] for a couple of months this winter.    Past Medical History:   Patient Active Problem List   Diagnosis     History of melanoma     Idiopathic scoliosis     Hematochezia     Benign neoplasm of rectum     History of dysplastic nevus     Past Medical History:   Diagnosis Date     Malignant melanoma (H)      Scoliosis      Past Surgical History:   Procedure Laterality Date     BIOPSY OF SKIN LESION       MOHS MICROGRAPHIC PROCEDURE         Social History:  Patient reports that she has quit smoking. she has never used smokeless tobacco. She reports that she drinks alcohol. She reports that she does not use drugs.    Family History:  Family History   Problem Relation Age of Onset     Diabetes Maternal Grandmother      Asthma Brother      Allergies Son      Skin Cancer Sister          BCC     Hearing Loss No family hx of        Medications:  Current Outpatient Medications   Medication Sig Dispense Refill     acetaminophen (TYLENOL) 325 MG tablet Take 325-650 mg by mouth as needed       IBUPROFEN PO Take 2 tablets by mouth as needed       No Known Allergies          Physical exam:  Vitals: There were no vitals taken for this visit.  GEN: This is a well developed, well-nourished female in no acute distress, in a pleasant mood.    SKIN: Full skin, which includes the head/face, both arms, chest, back, abdomen,both legs, genitalia and/or groin buttocks, digits and/or nails, was examined.  -There is no erythema, telangectasias, nodularity, or pigmentation on the scars of the right forearm and right post auricular region..  -There are waxy stuck on tan to brown papules on the trunk and extremities.  -There are dome shaped bright red papules on the exam.   -Shotty cervical adenopathy but no submandibular, axillary or inguinal nodes  -No other lesions of concern on areas examined.     Impression/Plan:  1. History of melanoma in situ of the right forearm, no clinical evidence of recurrence    We will clinically monitor this area.      2. History of nonmelanoma skin cancer, no clincial evidence of recurrence    We will clinically monitor this area.      3. Seborrheic keratosis, non irritated    Reassurance    CC Referred Self, MD  No address on file on close of this encounter.  Follow-up in 6 months, earlier for new or changing lesions. Consider yearly after the next visit.      Staff Involved:  Staff Only    Melinda Fierro MD

## 2018-12-10 NOTE — NURSING NOTE
Dermatology Rooming Note    Lou Chaudhary's goals for this visit include:   Chief Complaint   Patient presents with     Derm Problem     Lou is here for a skin check, states no concerns today.        Ginger Nesbitt. HERMINIAN

## 2019-06-27 ENCOUNTER — DOCUMENTATION ONLY (OUTPATIENT)
Dept: CARE COORDINATION | Facility: CLINIC | Age: 64
End: 2019-06-27

## 2019-08-05 ENCOUNTER — OFFICE VISIT (OUTPATIENT)
Dept: DERMATOLOGY | Facility: CLINIC | Age: 64
End: 2019-08-05
Payer: COMMERCIAL

## 2019-08-05 DIAGNOSIS — D18.01 CHERRY ANGIOMA: ICD-10-CM

## 2019-08-05 DIAGNOSIS — Z12.83 SKIN CANCER SCREENING: ICD-10-CM

## 2019-08-05 DIAGNOSIS — Z85.828 HISTORY OF NONMELANOMA SKIN CANCER: ICD-10-CM

## 2019-08-05 DIAGNOSIS — L82.1 SEBORRHEIC KERATOSIS: ICD-10-CM

## 2019-08-05 DIAGNOSIS — L82.0 INFLAMED SEBORRHEIC KERATOSIS: Primary | ICD-10-CM

## 2019-08-05 DIAGNOSIS — Z85.820 HISTORY OF MELANOMA: ICD-10-CM

## 2019-08-05 ASSESSMENT — PAIN SCALES - GENERAL
PAINLEVEL: NO PAIN (0)
PAINLEVEL: NO PAIN (1)

## 2019-08-05 NOTE — PROGRESS NOTES
[facility] Dermatology Note      Dermatology Problem List:  1. Malignant melanoma, superficial spreading, right dorsal forearm  -Stage IA  -Breslow's 0.42m  -no mites, nonulcerated  -s/p WLE 12/8/2015      2. Basal Cell Carcinoma right post auricular s/p Mohs 12/5/2017     3.  Actinic keratosis  -s/p cryotherapy      4. Mildly dysplastic junctional nevus, lower back  -s/p excision 11/27/2015      5. Psoriasiform dermatitis, right earlobe  -s/p biopsy 1//2016  -No current flare or Tx       Encounter Date: Aug 5, 2019    CC:   Chief Complaint   Patient presents with     Derm Problem     Luo is here for a skin check, has an area of concern behind her right ear and nose.          History of Present Illness:  Ms. Lou Chaudhary is a 63 year old female who presents as a follow-up for skin check. The patient was last seen 12/10/18 when her skin exam was within normal limits. Today the patient has one concern -- a scaly area on her left neck. Today it seems to be resolving, but it felt rough to the touch. Otherwise asymptomatic. No other lesions of concern today. Nothing bleeding, tender or rapidly changing. Does have a brown bump on the left clavicle that gets caught on her clothing, wondering about removing this due to irritation. No fevers, chills, night sweats or unexplained weight loss.    Past Medical History:   Patient Active Problem List   Diagnosis     History of melanoma     Idiopathic scoliosis     Hematochezia     Benign neoplasm of rectum     History of dysplastic nevus     Past Medical History:   Diagnosis Date     Malignant melanoma (H)      Scoliosis      Past Surgical History:   Procedure Laterality Date     BIOPSY OF SKIN LESION       MOHS MICROGRAPHIC PROCEDURE         Social History:  Patient reports that she has quit smoking. She has never used smokeless tobacco. She reports that she drinks alcohol. She reports that she does not use drugs.    Family History:  Family History    Problem Relation Age of Onset     Diabetes Maternal Grandmother      Asthma Brother      Allergies Son      Skin Cancer Sister         BCC     Hearing Loss No family hx of        Medications:  Current Outpatient Medications   Medication Sig Dispense Refill     acetaminophen (TYLENOL) 325 MG tablet Take 325-650 mg by mouth as needed       IBUPROFEN PO Take 2 tablets by mouth as needed          No Known Allergies      Review of Systems:  -As per HPI  -Constitutional: Otherwise feeling well today, in usual state of health.  -Skin: As above in HPI. No additional skin concerns.    Physical exam:  Vitals: There were no vitals taken for this visit.  GEN: This is a well developed, well-nourished female in no acute distress, in a pleasant mood.    SKIN: Full skin, which includes the head/face, both arms, chest, back, abdomen,both legs, genitalia and/or groin buttocks, digits and/or nails, was examined.  -Right lateral neck with a 1-2mm dome shaped white papule  -1-2mm flesh colored papule on the nasal dorsum  -There is no erythema, telangectasias, nodularity, or pigmentation on the scars of the right forearm and right post auricular region  -Waxy brown papule on the left clavicle  -There are waxy stuck on tan to brown papules on the trunk and extremities.  -There are dome shaped bright red papules on the exam.   -No other lesions of concern on areas examined.   LYMPH: no cervical, supraclavicular, axillary or inguinal LAD    Impression/Plan:  1. History of melanoma R dorsal forearm and Hx of NMSC R postauricular, no clinical evidence of recurrence    ABCDs of melanoma were discussed and self skin checks were advised.    Sun precaution was advised including the use of sun screens of SPF 30 or higher, sun protective clothing, and avoidance of tanning beds.    2. Inflamed SK, L clavicle    Cryotherapy procedure note: After verbal consent and discussion of risks and benefits including but no limited to dyspigmentation/scar,  blister, and pain, 1 was(were) treated with 1-2mm freeze border for 2 cycles with liquid nitrogen. Post cryotherapy instructions were provided.    3. Benign skin findings (fibrous papule-nasal dorsum, milium - R neck, cherry angiomas, SK's)    Reviewed benign etiology, no treatment indicated      CC Referred Self, MD  No address on file on close of this encounter.  Follow-up in 9 months, earlier for new or changing lesions.       Dr. Fierro staffed the patient.    Staff Involved:  Resident(Silvano Campbell)/Staff    Silvano Campbell MD  Dermatology Resident, PGY4      .I was present for the entire procedure. Melinda Fierro MD  .I, Melinda Fierro MD, saw this patient with the resident and agree with the resident s findings and plan of care as documented in the resident s note.

## 2019-08-05 NOTE — PATIENT INSTRUCTIONS
Cryotherapy    What is it?    Use of a very cold liquid, such as liquid nitrogen, to freeze and destroy abnormal skin cells that need to be removed    What should I expect?    Tenderness and redness    A small blister that might grow and fill with dark purple blood. There may be crusting.    More than one treatment may be needed if the lesions do not go away.    How do I care for the treated area?    Gently wash the area with your hands when bathing.    Use a thin layer of Vaseline to help with healing. You may use a Band-Aid.     The area should heal within 7-10 days and may leave behind a pink or lighter color.     Do not use an antibiotic or Neosporin ointment.     You may take acetaminophen (Tylenol) for pain.     Call your Doctor if you have:    Severe pain    Signs of infection (warmth, redness, cloudy yellow drainage, and or a bad smell)    Questions or concerns    Who should I call with questions?       [facility]: [phone]       [facility]: [phone]       For urgent needs outside of business hours call the [facility] at [phone]        and ask for the dermatology resident on call            Cryotherapy    What is it?    Use of a very cold liquid, such as liquid nitrogen, to freeze and destroy abnormal skin cells that need to be removed    What should I expect?    Tenderness and redness    A small blister that might grow and fill with dark purple blood. There may be crusting.    More than one treatment may be needed if the lesions do not go away.    How do I care for the treated area?    Gently wash the area with your hands when bathing.    Use a thin layer of Vaseline to help with healing. You may use a Band-Aid.     The area should heal within 7-10 days and may leave behind a pink or lighter color.     Do not use an antibiotic or Neosporin ointment.     You may take acetaminophen (Tylenol) for pain.     Call your Doctor if  you have:    Severe pain    Signs of infection (warmth, redness, cloudy yellow drainage, and or a bad smell)    Questions or concerns    Who should I call with questions?       [facility]: [phone]       [facility]: [phone]       For urgent needs outside of business hours call the [facility] at [phone]        and ask for the dermatology resident on call

## 2019-08-05 NOTE — NURSING NOTE
Dermatology Rooming Note    oLu Chaudhary's goals for this visit include:   Chief Complaint   Patient presents with     Derm Problem     Lou is here for a skin check, has an area of concern behind her right ear and nose.        Ginger Nesbitt LPN

## 2019-09-29 ENCOUNTER — HEALTH MAINTENANCE LETTER (OUTPATIENT)
Age: 64
End: 2019-09-29

## 2020-03-15 ENCOUNTER — HEALTH MAINTENANCE LETTER (OUTPATIENT)
Age: 65
End: 2020-03-15

## 2021-01-15 ENCOUNTER — HEALTH MAINTENANCE LETTER (OUTPATIENT)
Age: 66
End: 2021-01-15

## 2021-05-09 ENCOUNTER — HEALTH MAINTENANCE LETTER (OUTPATIENT)
Age: 66
End: 2021-05-09

## 2021-08-18 ENCOUNTER — OFFICE VISIT (OUTPATIENT)
Dept: DERMATOLOGY | Facility: CLINIC | Age: 66
End: 2021-08-18
Payer: COMMERCIAL

## 2021-08-18 DIAGNOSIS — L82.1 SEBORRHEIC KERATOSIS: ICD-10-CM

## 2021-08-18 DIAGNOSIS — L57.0 AK (ACTINIC KERATOSIS): ICD-10-CM

## 2021-08-18 DIAGNOSIS — Z85.828 HISTORY OF NONMELANOMA SKIN CANCER: ICD-10-CM

## 2021-08-18 DIAGNOSIS — Z85.820 HISTORY OF MELANOMA: Primary | ICD-10-CM

## 2021-08-18 PROCEDURE — 99213 OFFICE O/P EST LOW 20 MIN: CPT | Mod: 25 | Performed by: DERMATOLOGY

## 2021-08-18 PROCEDURE — 17000 DESTRUCT PREMALG LESION: CPT | Performed by: DERMATOLOGY

## 2021-08-18 ASSESSMENT — PAIN SCALES - GENERAL: PAINLEVEL: NO PAIN (0)

## 2021-08-18 NOTE — LETTER
8/18/2021       RE: Lou Chaudhary  [address] [address]     Dear Colleague,    Thank you for referring your patient, Lou Chaudhary, to the [facility] DERMATOLOGY [facility] [place] [facility]. Please see a copy of my visit note below.    [facility] Dermatology Note  Encounter Date: Aug 18, 2021  Office Visit     Dermatology Problem List:  1. Malignant melanoma, superficial spreading, right dorsal forearm  -Stage IA  -Breslow's 0.42m  -no mites, nonulcerated  -s/p WLE 12/8/2015      2. Basal Cell Carcinoma right post auricular s/p Mohs 12/5/2017     3.  Actinic keratosis  -s/p cryotherapy      4. Mildly dysplastic junctional nevus, lower back  -s/p excision 11/27/2015      5. Psoriasiform dermatitis, right earlobe  -s/p biopsy 1/2016  -No current flare or Tx    ____________________________________________    Assessment & Plan:  1. History of melanoma  - no evidence of recurrence  - continue yearly skin checks    2. History of nonmelanoma skin cancer  - no evidence of recurrence  - continue yearly skin checks    3. AK (actinic keratosis)  - cryotherapy. See procedure note below    4. Seborrheic keratosis  - benign. No treatment necessary    5. Cherry Angiomas  - benign. No treatment necessary.         Procedures Performed:   - Cryotherapy procedure note, location(s): nasal dorsum. After verbal consent and discussion of risks and benefits including, but not limited to, dyspigmentation/scar, blister, and pain, 1 lesion(s) was(were) treated with 1-2 mm freeze border for 1-2 cycles with liquid nitrogen. Post cryotherapy instructions were provided.  - Procedure(s) performed by faculty.     Follow-up: 1 year or earlier for new or changing lesions    Staff and Medical Student:     Patient seen and staffed with Dr. Fierro.     Susan Mccormack, MS3  [employer]      I was present with the medical student who  participated in the service and in the documentation of the note. I have verified the history and personally performed the physical exam and medical decision making. I agree with the assessment and plan of care as documented in the note.  Melinda Fierro MD    ____________________________________________    CC: skin check  HPI:  Ms. Lou Chaudhary is a(n) 65 year old female who presents today as a return patient for skin check. She was last seen by myself on 8/05/2019. She has a history of melanoma on her right dorsal forearm and BCC on right post auricular.     She has a few spots of concern behind her right ear and on her nose.     Patient is otherwise feeling well, without additional skin concerns.    Labs:  None reviewed.    Physical Exam:  Vitals: There were no vitals taken for this visit.  SKIN: Full skin, which includes the head/face, both arms, chest, back, abdomen,both legs, buttocks, digits and/or nails, was examined.  - brown macules scattered on sun exposed areas  - bright red dome shaped papules located on the trunk and extremities  - waxy hyperkeratotic papules scattered through body ( right posterior ear, back, extremities)   erythematous  - red gritty papule nasal bridge  No cervical, submandibular, axillary or inguinal adenopathy  - No other lesions of concern on areas examined.     Medications:  Current Outpatient Medications   Medication     acetaminophen (TYLENOL) 325 MG tablet     IBUPROFEN PO     No current facility-administered medications for this visit.      Past Medical History:   Patient Active Problem List   Diagnosis     History of melanoma     Idiopathic scoliosis     Hematochezia     Benign neoplasm of rectum     History of dysplastic nevus     Past Medical History:   Diagnosis Date     Malignant melanoma (H)      Scoliosis         CC Referred Self, MD  No address on file on close of this encounter.

## 2021-08-18 NOTE — PATIENT INSTRUCTIONS
Cryotherapy    What is it?    Use of a very cold liquid, such as liquid nitrogen, to freeze and destroy abnormal skin cells that need to be removed    What should I expect?    Tenderness and redness    A small blister that might grow and fill with dark purple blood. There may be crusting.    More than one treatment may be needed if the lesions do not go away.    How do I care for the treated area?    Gently wash the area with your hands when bathing.    Use a thin layer of Vaseline to help with healing. You may use a Band-Aid.     The area should heal within 7-10 days and may leave behind a pink or lighter color.     Do not use an antibiotic or Neosporin ointment.     You may take acetaminophen (Tylenol) for pain.     Call your doctor if you have:    Severe pain    Signs of infection (warmth, redness, cloudy yellow drainage, and or a bad smell)    Questions or concerns    Who should I call with questions?       [facility]: [phone]       [facility]: [phone]       For urgent needs outside of business hours call the [facility] at [phone] and ask for the dermatology resident on call

## 2021-08-18 NOTE — NURSING NOTE
Dermatology Rooming Note    Lou Chaudhary's goals for this visit include:   Chief Complaint   Patient presents with     Skin Check     Lou states that she is here for a skin check today. - areas of concern: tip of nose, behind ear.     Zee Ricci LPN

## 2021-08-18 NOTE — PROGRESS NOTES
[facility] Dermatology Note  Encounter Date: Aug 18, 2021  Office Visit     Dermatology Problem List:  1. Malignant melanoma, superficial spreading, right dorsal forearm  -Stage IA  -Breslow's 0.42m  -no mites, nonulcerated  -s/p WLE 12/8/2015      2. Basal Cell Carcinoma right post auricular s/p Mohs 12/5/2017     3.  Actinic keratosis  -s/p cryotherapy      4. Mildly dysplastic junctional nevus, lower back  -s/p excision 11/27/2015      5. Psoriasiform dermatitis, right earlobe  -s/p biopsy 1/2016  -No current flare or Tx    ____________________________________________    Assessment & Plan:  1. History of melanoma  - no evidence of recurrence  - continue yearly skin checks    2. History of nonmelanoma skin cancer  - no evidence of recurrence  - continue yearly skin checks    3. AK (actinic keratosis)  - cryotherapy. See procedure note below    4. Seborrheic keratosis  - benign. No treatment necessary    5. Cherry Angiomas  - benign. No treatment necessary.         Procedures Performed:   - Cryotherapy procedure note, location(s): nasal dorsum. After verbal consent and discussion of risks and benefits including, but not limited to, dyspigmentation/scar, blister, and pain, 1 lesion(s) was(were) treated with 1-2 mm freeze border for 1-2 cycles with liquid nitrogen. Post cryotherapy instructions were provided.  - Procedure(s) performed by faculty.     Follow-up: 1 year or earlier for new or changing lesions    Staff and Medical Student:     Patient seen and staffed with Dr. Fierro.     Susan Mccormack, MS3  [employer]      I was present with the medical student who participated in the service and in the documentation of the note. I have verified the history and personally performed the physical exam and medical decision making. I agree with the assessment and plan of care as documented in the note.  Melinda Fierro MD    ____________________________________________    CC:  skin check  HPI:  Ms. Lou Chauhdary is a(n) 65 year old female who presents today as a return patient for skin check. She was last seen by myself on 8/05/2019. She has a history of melanoma on her right dorsal forearm and BCC on right post auricular.     She has a few spots of concern behind her right ear and on her nose.     Patient is otherwise feeling well, without additional skin concerns.    Labs:  None reviewed.    Physical Exam:  Vitals: There were no vitals taken for this visit.  SKIN: Full skin, which includes the head/face, both arms, chest, back, abdomen,both legs, buttocks, digits and/or nails, was examined.  - brown macules scattered on sun exposed areas  - bright red dome shaped papules located on the trunk and extremities  - waxy hyperkeratotic papules scattered through body ( right posterior ear, back, extremities)   erythematous  - red gritty papule nasal bridge  No cervical, submandibular, axillary or inguinal adenopathy  - No other lesions of concern on areas examined.     Medications:  Current Outpatient Medications   Medication     acetaminophen (TYLENOL) 325 MG tablet     IBUPROFEN PO     No current facility-administered medications for this visit.      Past Medical History:   Patient Active Problem List   Diagnosis     History of melanoma     Idiopathic scoliosis     Hematochezia     Benign neoplasm of rectum     History of dysplastic nevus     Past Medical History:   Diagnosis Date     Malignant melanoma (H)      Scoliosis         CC Referred Self, MD  No address on file on close of this encounter.

## 2021-10-24 ENCOUNTER — HEALTH MAINTENANCE LETTER (OUTPATIENT)
Age: 66
End: 2021-10-24

## 2022-06-05 ENCOUNTER — HEALTH MAINTENANCE LETTER (OUTPATIENT)
Age: 67
End: 2022-06-05

## 2022-08-23 ENCOUNTER — OFFICE VISIT (OUTPATIENT)
Dept: DERMATOLOGY | Facility: CLINIC | Age: 67
End: 2022-08-23
Payer: COMMERCIAL

## 2022-08-23 DIAGNOSIS — L82.1 SEBORRHEIC KERATOSIS: ICD-10-CM

## 2022-08-23 DIAGNOSIS — Z85.820 HISTORY OF MELANOMA: ICD-10-CM

## 2022-08-23 DIAGNOSIS — D49.2 NEOPLASM OF SKIN: ICD-10-CM

## 2022-08-23 DIAGNOSIS — L57.0 AK (ACTINIC KERATOSIS): Primary | ICD-10-CM

## 2022-08-23 PROCEDURE — 88305 TISSUE EXAM BY PATHOLOGIST: CPT | Mod: TC | Performed by: DERMATOLOGY

## 2022-08-23 PROCEDURE — 88305 TISSUE EXAM BY PATHOLOGIST: CPT | Mod: 26 | Performed by: DERMATOLOGY

## 2022-08-23 PROCEDURE — 17000 DESTRUCT PREMALG LESION: CPT | Mod: XS | Performed by: DERMATOLOGY

## 2022-08-23 PROCEDURE — 99212 OFFICE O/P EST SF 10 MIN: CPT | Mod: 25 | Performed by: DERMATOLOGY

## 2022-08-23 PROCEDURE — 11102 TANGNTL BX SKIN SINGLE LES: CPT | Mod: GC | Performed by: DERMATOLOGY

## 2022-08-23 ASSESSMENT — PAIN SCALES - GENERAL: PAINLEVEL: NO PAIN (0)

## 2022-08-23 NOTE — LETTER
8/23/2022       RE: Lou Chaudhary  [address] [address]     Dear Colleague,    Thank you for referring your patient, Lou Chaudhary, to the [facility] DERMATOLOG[facility] [place] at [facility]. Please see a copy of my visit note below.    [facility] Dermatology Note  Encounter Date: Aug 23, 2022  Office Visit     Dermatology Problem List:  0. NUB  - L lateral lower leg s/p shave bx 8/23/22    1. Malignant melanoma, superficial spreading, right dorsal forearm  -Stage IA, Breslow's 0.42m, no mites, nonulcerated  -S/p WLE 12/8/2015      2. BCC right post auricular s/p Mohs 12/5/2017     3.  Actinic keratosis  - Cryotherapy      4. Mildly dysplastic junctional nevus, lower back  - S/p excision 11/27/2015      5. Psoriasiform dermatitis, right earlobe  - S/p biopsy 1/2016  - No current flare or tx    ____________________________________________    Assessment & Plan:  # Neoplasm of uncertain behavior  R lateral lower leg, shiny, pink tender papule, r/o BCC   - shave biopsy (see procedure note)    # History of melanoma  No evidence of recurrence  - Continue yearly skin checks    # History of nonmelanoma skin cancer  No evidence of recurrence  - Continue yearly skin checks    #. AK, R medial lower leg  Premalignant nature reviewed  - Cryotherapy. See procedure note below    # Benign skin findings include seborrheic keratosis and cherry angiomas  Lesions benign nature reviewed, no treatment is indicated at this time  - Monitor for any clinical changes    Procedures Performed:   - Cryotherapy procedure note, location(s): R medial lower leg. After verbal consent and discussion of risks and benefits including, but not limited to, dyspigmentation/scar, blister, and pain, 1 lesion(s) was(were) treated with 1-2 mm freeze border for 1-2 cycles with liquid nitrogen. Post cryotherapy instructions were provided.    Follow-up: 1 year or earlier  for new or changing lesions    Staff and Resident:    Mattie Bourgeois MD  Dermatology Resident PGY3  I was present for key portions of the biopsy and the entire cryotherapy procedure. Melinda Fierro MD  I, Melinda Fierro MD, saw this patient with the resident and agree with the resident s findings and plan of care as documented in the resident s note.      ____________________________________________    CC: skin check  HPI:  Ms. Lou Chaudhary is a(n) 66 year old female who presents today as a return patient for skin check. She was last seen 1 year ago at which time an AK was frozen on the nose. Today things are going well. Does not identify any concerns. The spot frozen on her nose has improved. Denies fevers, chills, weigh loss, lymphadenopathy.    Patient is otherwise feeling well, without additional skin concerns.    Labs:  None reviewed.    Physical Exam:  Vitals: There were no vitals taken for this visit.  SKIN: Full skin, which includes the head/face, both arms, chest, back, abdomen,both legs, buttocks, digits and/or nails, was examined.  - Brown macules scattered on sun exposed areas  - Bright red dome shaped papules located on the trunk and extremities  - Waxy hyperkeratotic papules scattered on trunk and extremities  - Shiny red-pink, tender papule on L lateral lower leg  - Red gritty papule R medial lower leg  - No cervical, submandibular, axillary or inguinal adenopathy  - No other lesions of concern on areas examined.     Medications:  Current Outpatient Medications   Medication     acetaminophen (TYLENOL) 325 MG tablet     IBUPROFEN PO     No current facility-administered medications for this visit.      Past Medical History:   Patient Active Problem List   Diagnosis     History of melanoma     Idiopathic scoliosis     Hematochezia     Benign neoplasm of rectum     History of dysplastic nevus     Past Medical History:   Diagnosis Date     Malignant melanoma (H)      Scoliosis         CC Referred  MD Alejandro  No address on file on close of this encounter.    Lidocaine-epinephrine 1-1:398566 % injection   1.5 mL once for one use, starting 8/23/2022 ending 8/23/2022,  2mL disp, R-0, injection  Injected by Dr. Melinda Fierro

## 2022-08-23 NOTE — PATIENT INSTRUCTIONS
"Sunscreen   What does \"broad spectrum mean\"?   The best sunscreens protect against all UVB (Burning) rays and UVA (Aging, cAncer, tAnning) rays.    What does SPF mean?   SPF stands for  Sun Protection Factor  and represents the ability to screen only UVB (burning) rays. UVB rays are mostly blocked in all sunscreens, but only those that contain titanium dioxide, zinc oxide, mexoryl or Parsol 1789 (avobenzone) block the UVA spectrum. Zinc oxide is the best of all. Even though a sunscreen is labeled  UVA/UVB Protection  that is not entirely accurate because even partial protection allows this label!    What SPF should I chose?   Aim to get a sunscreen that is at least sun protection factor (SPF) 30. SPF 15 provides about 92-93% coverage, SPF 30 about 95-97% coverage, and SPF 45 about 98% coverage. That is to say, SPF 30 is not twice as good as SPF 15; think of it as a curve graph. If covering your whole body, you should be using 30 grams, or one ounce, which is how much is in one shot glass! That s a THICK layer!    UVA BLOCKERS:   Make sure your sunscreen has one of these active ingredients!   Everything else in the  active ingredients  box of a sunscreen label blocks UVB only.   Zinc Oxide (preferred)   Titanium dioxide   Parsol 1789 (avobenzone)   Elta MD: available at [facility] and [facility]  Mexoryl   Examples of some good sunscreens*   Absolutely-natural.com   Aveeno   Baby [place] sunscreens   [place]ena   Blue Lizard   BullFrog   CaliforniaBaby   Coppertone Spectra3   [facility]   Alejo   Fallene/TotalBlock   Neutrogena   NoAd   Vanicream   WaterBabies  Sticks work great, like Neutrogena pure and free baby SPF 60 stick    Darker Skin Types & Sunscreen  Patients with darker skin colors tend to like tinted sunscreens better as they appear less chalky on the skin. Many BB Creams are now available but make sure the sunscreen SPF is at least 30! Here are some brands of tinted sunscreens:  Neutrogenia " Tinted  EltaMD  La Roche Posay Anthelios 60 Ultra Light Sunscreen Fluid  Cerave Sunscreen SPF 50 Face Lotion Invisible Zinc  Clarins UV Plus sunscreen Multiprotection Tint  Dr Victoria + Every Sun Day sunscreen  Coppertone ClearlySheer Lotion SPF 50  Up & Up (Target) Sheer Dry-touch Lotion SPF 30  Palmers Cocoa Butter Formula Evertone Suncare Sunscreen Stick SPF 30  Per-fect Beauty skin Perfection Plus with SPF 30  Junetics Pure Energy Brightening Day Cream with Broad Spectrum SPF 50  Clinque SPF 30 Mineral Sunscreen Fluid for Face  Popeye Multicorrection 5 in 1 Chest, Neck and Face Cream with SPF 30  Cover FX Clear Cover Invisible Sunscreen Broad Spectrum SPF 30  Ene Rakel Age Perfect Hydra-Nutrition Facial Oil SPF 30  Solbar Shield SPF 40  Tropical Sands All Natural SPF 50  * Note, this list is not meant to be comprehensive, just trying to pull together some names that might be helpful to you. If they are not at a local store, they can be found on-line for order. EACH NAMEBRAND MAKES MULTIPLE TYPES SO YOU STILL HAVE TO CHECK FOR ONE OF THE FOUR INGREDIENTS LISTED IN THE LEFT COLUMN!!!   Combination sunscreen-insect repellants are not recommended as sunscreen needs to be reapplied every 2 hours; insect repellant does not, and that would lead to too much DEET exposure.   Sunscreen is not recommended for infants under the age of 6 months. Use clothing, shade and sun avoidance for small infants. Sunscreen clothing and hats are also important for people of all ages. Note that PubCoder* is a company based out of [place], [place]! Other good items can be found in stores and on-line.   Sunscreen sprays are okay for RE-APPLICATION only. Most people do not spray enough on to get good enough protection. Recommendation: Use a lotion-based sunscreen to get a good first/base layer.   Vitamin D: We get vitamin D through the skin. If you do not get enough sun in the summer to get tan lines, you should take a vitamin D supplement:  400units for children and 1000units for adults per day.   Good daily facial moisturizers with sunscreen:   [facility] Block Sheer   Anthelios by LaRochePosay   Oil of Olay Complete Defense for sensitive skin   Sunscreen Recommendations for Sensitive Skin    The following sunscreens may be better for your child's sensitive skin. The main active ingredients are inert, either titanium dioxide or zinc oxide. These ingredients are less irritating than chemical sunscreens.   1) Aveeno Active Natural Protection Mineral Block Lotion SPF 30   2) Aveeno Baby Natural Protection Face Stick SPF 50+   3) Banana Boat Natural Reflect (baby or kids) SPF 50+   4) Stow's Bees Chemical-Free Sunscreen SPF 30   5) Blue Lizard Baby SPF 30+   6) Blue Lizard for Sensitive Skin SPF 30+   7) Cotz Pure SPF 30   8) Cotz Face SPF 40   9) Cotz 20% Zinc SPF 35   10) CVS Sensitive Skin SPF 30   11) CVS Baby Lotion Sunscreen SPF 60+   12) Mustella Broad Spectrum SPF 50+/Mineral Sunscreen Stick   13) Neutrogena Sensitive Skin SPF 30   14) Neutrogena Sensitive Skin SPF 60+   15) PreSun Sensitive Sunblock SPF 28   16) Vanicream Sunscreen for Sensitive Skin SPF 60   17) Walgreen's Sensitive Skin SPF 70   Many local pharmacies and [facility] carry some of these options or you may purchase them online at www.Aldexa Therapeutics or wwwTeamVisibility.     Sun protective Clothing:  www.coolibar.Plutora  www.sunprecautions.com  www.Kolo Technologies.Plutora    ------    Cryotherapy    What is it?  Use of a very cold liquid, such as liquid nitrogen, to freeze and destroy abnormal skin cells that need to be removed    What should I expect?  Tenderness and redness  A small blister that might grow and fill with dark purple blood. There may be crusting.  More than one treatment may be needed if the lesions do not go away.    How do I care for the treated area?  Gently wash the area with your hands when bathing.  Use a thin layer of Vaseline to help with healing. You may use a  Band-Aid.   The area should heal within 7-10 days and may leave behind a pink or lighter color.   Do not use an antibiotic or Neosporin ointment.   You may take acetaminophen (Tylenol) for pain.     Call your doctor if you have:  Severe pain  Signs of infection (warmth, redness, cloudy yellow drainage, and or a bad smell)  Questions or concerns    Who should I call with questions?      [facility]: [phone]      [facility]: [phone]      For urgent needs outside of business hours call the [facility] at [phone] and ask for the dermatology resident on call    ----    Wound Care After a Biopsy    What is a skin biopsy?  A skin biopsy allows the doctor to examine a very small piece of tissue under the microscope to determine the diagnosis and the best treatment for the skin condition. A local anesthetic (numbing medicine)  is injected with a very small needle into the skin area to be tested. A small piece of skin is taken from the area. Sometimes a suture (stitch) is used.     What are the risks of a skin biopsy?  I will experience scar, bleeding, swelling, pain, crusting and redness. I may experience incomplete removal or recurrence. Risks of this procedure are excessive bleeding, bruising, infection, nerve damage, numbness, thick (hypertrophic or keloidal) scar and non-diagnostic biopsy.    How should I care for my wound for the first 24 hours?  Keep the wound dry and covered for 24 hours  If it bleeds, hold direct pressure on the area for 15 minutes. If bleeding does not stop then go to the emergency room  Avoid strenuous exercise the first 1-2 days or as your doctor instructs you    How should I care for the wound after 24 hours?  After 24 hours, remove the bandage  You may bathe or shower as normal  If you had a scalp biopsy, you can shampoo as usual and can use shower water to clean the biopsy site daily  Clean the wound twice a day  with gentle soap and water  Do not scrub, be gentle  Apply white petroleum/Vaseline after cleaning the wound with a cotton swab or a clean finger, and keep the site covered with a Bandaid /bandage. Bandages are not necessary with a scalp biopsy  If you are unable to cover the site with a Bandaid /bandage, re-apply ointment 2-3 times a day to keep the site moist. Moisture will help with healing  Avoid strenuous activity for first 1-2 days  Avoid lakes, rivers, pools, and oceans until the stitches are removed or the site is healed    How do I clean my wound?  Wash hands thoroughly with soap or use hand  before all wound care  Clean the wound with gentle soap and water  Apply white petroleum/Vaseline  to wound after it is clean  Replace the Bandaid /bandage to keep the wound covered for the first few days or as instructed by your doctor  If you had a scalp biopsy, warm shower water to the area on a daily basis should suffice    What should I use to clean my wound?   Cotton-tipped applicators (Qtips )  White petroleum jelly (Vaseline ). Use a clean new container and use Q-tips to apply.  Bandaids   as needed  Gentle soap     How should I care for my wound long term?  Do not get your wound dirty  Keep up with wound care for one week or until the area is healed.  A small scab will form and fall off by itself when the area is completely healed. The area will be red and will become pink in color as it heals. Sun protection is very important for how your scar will turn out. Sunscreen with an SPF 30 or greater is recommended once the area is healed.  You should have some soreness but it should be mild and slowly go away over several days. Talk to your doctor about using tylenol for pain,    When should I call my doctor?  If you have increased:   Pain or swelling  Pus or drainage (clear or slightly yellow drainage is ok)  Temperature over 100F  Spreading redness or warmth around wound    When will I hear about my  results?  The biopsy results can take 2 weeks to come back.  Your results will automatically release to [employer] before your provider has even reviewed them.  The clinic will call you with the results, send you a [employer] message, or have you schedule a follow-up clinic or phone time to discuss the results.  Contact our clinics if you do not hear from us in 2 weeks.    Who should I call with questions?  [facility]: [phone]  [facility]: [phone]  For urgent needs outside of business hours call the [facility] at [phone] and ask for the dermatology resident on call

## 2022-08-23 NOTE — NURSING NOTE
Chief Complaint   Patient presents with     Skin Check     Lou is here today for her annual skin check, hs said she has no new areas of concern today.     Tho Plasencia, Paramedic

## 2022-08-23 NOTE — PROGRESS NOTES
[facility] Dermatology Note  Encounter Date: Aug 23, 2022  Office Visit     Dermatology Problem List:  0. NUB  - L lateral lower leg s/p shave bx 8/23/22    1. Malignant melanoma, superficial spreading, right dorsal forearm  -Stage IA, Breslow's 0.42m, no mites, nonulcerated  -S/p WLE 12/8/2015      2. BCC right post auricular s/p Mohs 12/5/2017     3.  Actinic keratosis  - Cryotherapy      4. Mildly dysplastic junctional nevus, lower back  - S/p excision 11/27/2015      5. Psoriasiform dermatitis, right earlobe  - S/p biopsy 1/2016  - No current flare or tx    ____________________________________________    Assessment & Plan:  # Neoplasm of uncertain behavior  R lateral lower leg, shiny, pink tender papule, r/o BCC   - shave biopsy (see procedure note)    # History of melanoma  No evidence of recurrence  - Continue yearly skin checks    # History of nonmelanoma skin cancer  No evidence of recurrence  - Continue yearly skin checks    #. AK, R medial lower leg  Premalignant nature reviewed  - Cryotherapy. See procedure note below    # Benign skin findings include seborrheic keratosis and cherry angiomas  Lesions benign nature reviewed, no treatment is indicated at this time  - Monitor for any clinical changes    Procedures Performed:   - Cryotherapy procedure note, location(s): R medial lower leg. After verbal consent and discussion of risks and benefits including, but not limited to, dyspigmentation/scar, blister, and pain, 1 lesion(s) was(were) treated with 1-2 mm freeze border for 1-2 cycles with liquid nitrogen. Post cryotherapy instructions were provided.    Follow-up: 1 year or earlier for new or changing lesions    Staff and Resident:    Mattie Bourgeois MD  Dermatology Resident PGY3  I was present for key portions of the biopsy and the entire cryotherapy procedure. Melinda Fierro MD  I, Melinda Fierro MD, saw this patient with the resident and agree with the resident s findings and  plan of care as documented in the resident s note.      ____________________________________________    CC: skin check  HPI:  Ms. Lou Chaudhary is a(n) 66 year old female who presents today as a return patient for skin check. She was last seen 1 year ago at which time an AK was frozen on the nose. Today things are going well. Does not identify any concerns. The spot frozen on her nose has improved. Denies fevers, chills, weigh loss, lymphadenopathy.    Patient is otherwise feeling well, without additional skin concerns.    Labs:  None reviewed.    Physical Exam:  Vitals: There were no vitals taken for this visit.  SKIN: Full skin, which includes the head/face, both arms, chest, back, abdomen,both legs, buttocks, digits and/or nails, was examined.  - Brown macules scattered on sun exposed areas  - Bright red dome shaped papules located on the trunk and extremities  - Waxy hyperkeratotic papules scattered on trunk and extremities  - Shiny red-pink, tender papule on L lateral lower leg  - Red gritty papule R medial lower leg  - No cervical, submandibular, axillary or inguinal adenopathy  - No other lesions of concern on areas examined.     Medications:  Current Outpatient Medications   Medication     acetaminophen (TYLENOL) 325 MG tablet     IBUPROFEN PO     No current facility-administered medications for this visit.      Past Medical History:   Patient Active Problem List   Diagnosis     History of melanoma     Idiopathic scoliosis     Hematochezia     Benign neoplasm of rectum     History of dysplastic nevus     Past Medical History:   Diagnosis Date     Malignant melanoma (H)      Scoliosis         CC Referred Self, MD  No address on file on close of this encounter.

## 2022-08-23 NOTE — PROGRESS NOTES
Lidocaine-epinephrine 1-1:781029 % injection   1.5 mL once for one use, starting 8/23/2022 ending 8/23/2022,  2mL disp, R-0, injection  Injected by Dr. Melinda Fierro

## 2022-08-24 DIAGNOSIS — C44.719 BASAL CELL CARCINOMA (BCC) OF LEFT LOWER LEG: Primary | ICD-10-CM

## 2022-08-24 LAB
PATH REPORT.COMMENTS IMP SPEC: ABNORMAL
PATH REPORT.COMMENTS IMP SPEC: ABNORMAL
PATH REPORT.COMMENTS IMP SPEC: YES
PATH REPORT.FINAL DX SPEC: ABNORMAL
PATH REPORT.GROSS SPEC: ABNORMAL
PATH REPORT.MICROSCOPIC SPEC OTHER STN: ABNORMAL
PATH REPORT.RELEVANT HX SPEC: ABNORMAL

## 2022-08-25 ENCOUNTER — TELEPHONE (OUTPATIENT)
Dept: DERMATOLOGY | Facility: CLINIC | Age: 67
End: 2022-08-25

## 2022-08-25 NOTE — TELEPHONE ENCOUNTER
[facility] Call Center    Phone Message    May a detailed message be left on voicemail: yes     Reason for Call: Pt is scheduled for mohs 11/15 and would like to be added to the wait list. Please add pt to wait list. Thanks     Action Taken: Message routed to:  [facility] ([facility] Derm    Travel Screening: Not Applicable

## 2022-08-26 NOTE — TELEPHONE ENCOUNTER
FUTURE VISIT INFORMATION      FUTURE VISIT INFORMATION:    Date: 11.15.22    Time: 9:30    Location: [facility]  REFERRAL INFORMATION:    Referring provider:  Sri    Referring providers clinic:  Derm    Reason for visit/diagnosis  bcc left lower leg, Mohs    RECORDS REQUESTED FROM:       Clinic name Comments Records Status Imaging Status   Derm 8.23.22  Path # [id number] [facility]

## 2022-10-15 ENCOUNTER — HEALTH MAINTENANCE LETTER (OUTPATIENT)
Age: 67
End: 2022-10-15

## 2022-11-15 ENCOUNTER — OFFICE VISIT (OUTPATIENT)
Dept: DERMATOLOGY | Facility: CLINIC | Age: 67
End: 2022-11-15
Payer: COMMERCIAL

## 2022-11-15 ENCOUNTER — PRE VISIT (OUTPATIENT)
Dept: DERMATOLOGY | Facility: CLINIC | Age: 67
End: 2022-11-15

## 2022-11-15 ENCOUNTER — TELEPHONE (OUTPATIENT)
Dept: DERMATOLOGY | Facility: CLINIC | Age: 67
End: 2022-11-15

## 2022-11-15 VITALS — HEART RATE: 87 BPM | SYSTOLIC BLOOD PRESSURE: 144 MMHG | DIASTOLIC BLOOD PRESSURE: 84 MMHG

## 2022-11-15 DIAGNOSIS — C44.719 BASAL CELL CARCINOMA (BCC) OF LEFT LOWER LEG: ICD-10-CM

## 2022-11-15 PROCEDURE — 17314 MOHS ADDL STAGE T/A/L: CPT | Performed by: DERMATOLOGY

## 2022-11-15 PROCEDURE — 17313 MOHS 1 STAGE T/A/L: CPT | Performed by: DERMATOLOGY

## 2022-11-15 PROCEDURE — 12032 INTMD RPR S/A/T/EXT 2.6-7.5: CPT | Performed by: DERMATOLOGY

## 2022-11-15 ASSESSMENT — PAIN SCALES - GENERAL: PAINLEVEL: NO PAIN (0)

## 2022-11-15 NOTE — LETTER
11/15/2022       RE: Lou Chaudhary  [address] [address]     Dear Colleague,    Thank you for referring your patient, Lou Chaudhary, to the [facility] DERMATOLOGIC SURGERY CLINIC [place] [facility]. Please see a copy of my visit note below.    [facility] Mohs Surgery Procedure Note    Dermatology Problem List:     1. Malignant melanoma, superficial spreading, right dorsal forearm  - Stage IA, Breslow's 0.42m, no mites, nonulcerated  - S/p WLE 12/8/2015      2. NMSC  - BCC, L lateral lower leg s/p Mohs and linear repair 11/15/22  - BCC, right post auricular s/p Mohs 12/5/2017     3.  Actinic keratosis  - Cryotherapy      4. Mildly dysplastic junctional nevus, lower back  - S/p excision 11/27/2015      5. Psoriasiform dermatitis, right earlobe  - S/p biopsy 1/2016  - No current flare or tx     ____________________________________________      Case #: 1  Date of Service:  Nov 15, 2022  Surgery: Mohs micrographic surgery ([facility])  Staff surgeon: Moshe Yañez DO  Fellow surgeon: None  Resident surgeon: None  Nurse: Mary Ann Lambert CMA    Tumor Type: BCC  Location: left lower leg  Derm-Path Accession #: [id number]    Mohs Accession #:   Pre-Op Size: 1.4 cm x 1.0 cm  Final Defect Size: 1.8 cm x 1.3 cm  Number of Mohs stages: 2  Level of Defect: Fat  Local anesthetic: 7 mL 1% lidocaine with epinephrine  Repair Type: intermediate repair  Repair Size: 3.5 cm  Suture Material: 4-0 Monocryl; 4-0 Monocryl RS    Procedure:    Stage I  We discussed the principles of treatment and most likely complications including scarring, bleeding, infection, swelling, pain, crusting, nerve damage, large wound,  incomplete excision, wound dehiscence,  nerve damage, recurrence, and a second procedure may be recommended to obtain the best cosmetic or functional result.    Informed consent was obtained and the patient underwent the procedure as  follows:  The patient was placed supine on the operating table.  The cancer was identified, outlined with a marker, and verified by the patient.  The entire surgical field was prepped with chlorhexidine.  The surgical site was anesthetized using lidocaine with epinephrine.    The area of clinically apparent tumor was debulked. The layer of tissue was then surgically excised using a #15 blade and was then transferred onto a specimen sheet maintaining the orientation of the specimen. Hemostasis was obtained using electrocoagulation. The wound site was then covered with a dressing while the tissue samples were processed for examination.    The excised tissue was transported to the Mohs histology laboratory maintaining the tissue orientation.  The tissue specimen was relaxed so that the entire surgical margin was in a a single horizontal plane for sectioning and inked for precise mapping.  A precise reference map was drawn to reflect the sectioning of the specimen, colored inking of the margins, and orientation on the patient.  The tissue was processed using horizontal sectioning of the base and continuous peripheral margins.  The histopathologic sections were reviewed in conjunction with the reference map.    Total blocks: 1  Total slides: 2    Residual tumor was identified and indicated in red on the reference map, identifying the location where further tissue excision was necessary. Therefore, an additional stage of Mohs Micrographic surgery was deemed necessary.    Stage II  The patient was returned to the operating room, and the area prepped in the usual manner. The residual tumor was excised using the reference map as a guide. The specimen was transfered to a labeled specimen sheet maintaining the orientation of the specimen. Hemostasis was obtained and the wound site was covered with a dressing while the tissue was processed for examination.     The excised tissue was transported to the Mohs histology laboratory  maintaining orientation. The specimen margins were inked for precise mapping and a reference map was prepared for the is additional stage to maintain precise orientation as described above. The tissue was processed using horizontal sectioning of the base and continuous peripheral margins. The histopathologic sections were reviewed in conjunction with the reference map.     Total blocks: 1  Total slides: 2    There were no cancer cells visualized on examination, therefore Mohs surgery was complete.    REPAIR: An intermediate layered linear closure was selected as the procedure which would maximally preserve both function and cosmesis.    After the excision of the tumor, the area was carefully undermined. Hemostasis was obtained with electrocoagulation.  Closure was oriented so that the wound was parallel to the patient's relaxed skin tension lines.   The deep subcutaneous and dermal layers were then closed with 4-0 Monocryl sutures. The epidermis was then carefully approximated along the length of the wound using 4-0 Monocryl simple running sutures.     Estimated blood loss was less than 10 ml for all surgical sites. A sterile pressure dressing was applied and wound care instructions, with a written handout, were given. The patient was discharged from the [facility] alert and ambulatory.    Follow-up for suture removal: Not applicable as only dissolving sutures used    Moshe Yañez DO was immediately available for the entire surgery and was physicially present for the key portions of the procedure.      Scribe Disclosure:  I, AALIYAH SNELL, am serving as a scribe to document services personally performed by Moshe Yañez MD based on data collection and the provider's statements to me.     Provider Disclosure:   The documentation recorded by the scribe accurately reflects the services I personally performed and the decisions made by me.  I personally performed the procedures today.    Moshe Yañez      Department of Dermatology  [facility]: Phone: [phone], Fax:[phone]  [facility]: Phone: [phone], Fax: [phone]    Care and Laboratory Testing Performed at:  [facility]   [facility] - Dermatologic Surgery Clinic  [address] [place] [address]   Mail Code [address] [place] [id number]

## 2022-11-15 NOTE — TELEPHONE ENCOUNTER
Pt called back and left vm. Called back and left pt another vm with our ph#.  Sophia Alcaraz, Procedure  11/15/2022 3:35 PM

## 2022-11-15 NOTE — NURSING NOTE
Chief Complaint   Patient presents with     Derm Problem     Patient is here today for mohs on left lower leg.      Mary Ann PASTRANA CMA

## 2022-11-15 NOTE — TELEPHONE ENCOUNTER
Left vm with our ph#. Called to schedule 2 week wound check.    Sophia Alcaraz, Procedure  11/15/2022 1:30 PM

## 2022-11-15 NOTE — Clinical Note
Shanice Jamil I have you contact this patient to set up a 2 week follow up. I think she wanted to do virtual (telephone with pictures submitted by email or [employer]). Thank you.

## 2022-11-15 NOTE — PROGRESS NOTES
[facility] Mohs Surgery Procedure Note    Dermatology Problem List:     1. Malignant melanoma, superficial spreading, right dorsal forearm  - Stage IA, Breslow's 0.42m, no mites, nonulcerated  - S/p WLE 12/8/2015      2. NMSC  - BCC, L lateral lower leg s/p Mohs and linear repair 11/15/22  - BCC, right post auricular s/p Mohs 12/5/2017     3.  Actinic keratosis  - Cryotherapy      4. Mildly dysplastic junctional nevus, lower back  - S/p excision 11/27/2015      5. Psoriasiform dermatitis, right earlobe  - S/p biopsy 1/2016  - No current flare or tx     ____________________________________________      Case #: 1  Date of Service:  Nov 15, 2022  Surgery: Mohs micrographic surgery (MMS)  Staff surgeon: Moshe Yañez DO  Fellow surgeon: None  Resident surgeon: None  Nurse: Mary Ann Lambert CMA    Tumor Type: BCC  Location: left lower leg  Derm-Path Accession #: [id number]    Mohs Accession #:   Pre-Op Size: 1.4 cm x 1.0 cm  Final Defect Size: 1.8 cm x 1.3 cm  Number of Mohs stages: 2  Level of Defect: Fat  Local anesthetic: 7 mL 1% lidocaine with epinephrine  Repair Type: intermediate repair  Repair Size: 3.5 cm  Suture Material: 4-0 Monocryl; 4-0 Monocryl RS    Procedure:    Stage I  We discussed the principles of treatment and most likely complications including scarring, bleeding, infection, swelling, pain, crusting, nerve damage, large wound,  incomplete excision, wound dehiscence,  nerve damage, recurrence, and a second procedure may be recommended to obtain the best cosmetic or functional result.    Informed consent was obtained and the patient underwent the procedure as follows:  The patient was placed supine on the operating table.  The cancer was identified, outlined with a marker, and verified by the patient.  The entire surgical field was prepped with chlorhexidine.  The surgical site was anesthetized using lidocaine with epinephrine.    The area of clinically apparent tumor was  debulked. The layer of tissue was then surgically excised using a #15 blade and was then transferred onto a specimen sheet maintaining the orientation of the specimen. Hemostasis was obtained using electrocoagulation. The wound site was then covered with a dressing while the tissue samples were processed for examination.    The excised tissue was transported to the [facility] histology laboratory maintaining the tissue orientation.  The tissue specimen was relaxed so that the entire surgical margin was in a a single horizontal plane for sectioning and inked for precise mapping.  A precise reference map was drawn to reflect the sectioning of the specimen, colored inking of the margins, and orientation on the patient.  The tissue was processed using horizontal sectioning of the base and continuous peripheral margins.  The histopathologic sections were reviewed in conjunction with the reference map.    Total blocks: 1  Total slides: 2    Residual tumor was identified and indicated in red on the reference map, identifying the location where further tissue excision was necessary. Therefore, an additional stage of Mohs Micrographic surgery was deemed necessary.    Stage II  The patient was returned to the operating room, and the area prepped in the usual manner. The residual tumor was excised using the reference map as a guide. The specimen was transfered to a labeled specimen sheet maintaining the orientation of the specimen. Hemostasis was obtained and the wound site was covered with a dressing while the tissue was processed for examination.     The excised tissue was transported to the [facility] histology laboratory maintaining orientation. The specimen margins were inked for precise mapping and a reference map was prepared for the is additional stage to maintain precise orientation as described above. The tissue was processed using horizontal sectioning of the base and continuous peripheral margins. The histopathologic sections  were reviewed in conjunction with the reference map.     Total blocks: 1  Total slides: 2    There were no cancer cells visualized on examination, therefore Mohs surgery was complete.    REPAIR: An intermediate layered linear closure was selected as the procedure which would maximally preserve both function and cosmesis.    After the excision of the tumor, the area was carefully undermined. Hemostasis was obtained with electrocoagulation.  Closure was oriented so that the wound was parallel to the patient's relaxed skin tension lines.   The deep subcutaneous and dermal layers were then closed with 4-0 Monocryl sutures. The epidermis was then carefully approximated along the length of the wound using 4-0 Monocryl simple running sutures.     Estimated blood loss was less than 10 ml for all surgical sites. A sterile pressure dressing was applied and wound care instructions, with a written handout, were given. The patient was [facility] from the [facility] alert and ambulatory.    Follow-up for suture removal: Not applicable as only dissolving sutures used    Moshe Yañez DO was immediately available for the entire surgery and was physicially present for the key portions of the procedure.      Scribe Disclosure:  I, AALIYAH SNELL, am serving as a scribe to document services personally performed by Moshe Yañez MD based on data collection and the provider's statements to me.     Provider Disclosure:   The documentation recorded by the scribe accurately reflects the services I personally performed and the decisions made by me.  I personally performed the procedures today.    Moshe Yañez DO    Department of Dermatology  [facility]: Phone: [phone], Fax:[phone]  [facility]: Phone: [phone], Fax: [phone]    Care and Laboratory Testing Performed at:  [employer]  [facility] - Dermatologic Surgery Clinic  [address] [place] [address]   Mail Code [id number]   [place] [address]

## 2022-11-15 NOTE — PATIENT INSTRUCTIONS

## 2022-11-16 NOTE — TELEPHONE ENCOUNTER
Pt called back and left vm. Called and spoke to pt. Scheduled 2 week for 11/29. Pt requested in office.     Sophia Alcaraz, Procedure  11/16/2022 10:48 AM

## 2022-12-13 ENCOUNTER — OFFICE VISIT (OUTPATIENT)
Dept: DERMATOLOGY | Facility: CLINIC | Age: 67
End: 2022-12-13
Payer: COMMERCIAL

## 2022-12-13 DIAGNOSIS — Z85.828 HISTORY OF BASAL CELL CARCINOMA OF SKIN: Primary | ICD-10-CM

## 2022-12-13 DIAGNOSIS — Z51.89 VISIT FOR WOUND CHECK: ICD-10-CM

## 2022-12-13 PROCEDURE — 99024 POSTOP FOLLOW-UP VISIT: CPT | Performed by: DERMATOLOGY

## 2022-12-13 ASSESSMENT — PAIN SCALES - GENERAL: PAINLEVEL: NO PAIN (0)

## 2022-12-13 NOTE — LETTER
12/13/2022       RE: Lou Chaudhary  [address] [address]     Dear Colleague,    Thank you for referring your patient, Lou Chaudhary, to the [facility] DERMATOLOGIC SURGERY CLINIC [place] at [facility]. Please see a copy of my visit note below.    [facility] Dermatology Note  Encounter Date: Dec 13, 2022  Office Visit     Dermatology Problem List:    1. Malignant melanoma, superficial spreading, right dorsal forearm  - Stage IA, Breslow's 0.42m, no mites, nonulcerated  - S/p WLE 12/8/2015      2. NMSC  - BCC, L lateral lower leg s/p Mohs and linear repair 11/15/22  - BCC, right post auricular s/p Mohs 12/5/2017     3.  Actinic keratosis  - Cryotherapy      4. Mildly dysplastic junctional nevus, lower back  - S/p excision 11/27/2015      5. Psoriasiform dermatitis, right earlobe  - S/p biopsy 1/2016  - No current flare or tx     ____________________________________________     Assessment & Plan:     # BCC, L lateral lower leg s/p Mohs and linear repair 11/15/22  Wound is healing appropriately.   No visible suture material.   Continue wound care until wound is healed over with new pink skin.   Recommended skin cancer screening follow up 6 months from last exam, she will schedule at the pod on her way out.     Staff:     Moshe Yañez DO    Department of Dermatology  [facility]: Phone: [phone], Fax:[phone]  [facility]: Phone: [phone], Fax: [phone]      ____________________________________________    CC: Derm Problem (Patient is here today for a 4 week wound check of the lower leg.)    HPI:  Ms. Lou Chaudhary is a(n) 67 year old female who presents today for follow-up  after Mohs surgery and linear repair for BCC on the L lateral lower leg. She notes some focal pain with palpation along  the scar. She wonders if there is a suture lingering.     Patient is otherwise feeling well, without additional skin concerns.     Labs Reviewed:  N/A    Physical Exam:  Vitals: There were no vitals taken for this visit.  SKIN: Focused examination of left lateral lower leg was performed.  - linear surgical scar with wound edges apposed, there are focal erosions on the surface of the scar.   - No other lesions of concern on areas examined.     Medications:  Current Outpatient Medications   Medication     acetaminophen (TYLENOL) 325 MG tablet     IBUPROFEN PO     No current facility-administered medications for this visit.      Past Medical History:   Patient Active Problem List   Diagnosis     History of melanoma     Idiopathic scoliosis     Hematochezia     Benign neoplasm of rectum     History of dysplastic nevus     Past Medical History:   Diagnosis Date     Malignant melanoma (H)      Scoliosis

## 2022-12-13 NOTE — PROGRESS NOTES
[facility] Dermatology Note  Encounter Date: Dec 13, 2022  Office Visit     Dermatology Problem List:    1. Malignant melanoma, superficial spreading, right dorsal forearm  - Stage IA, Breslow's 0.42m, no mites, nonulcerated  - S/p WLE 12/8/2015      2. NMSC  - BCC, L lateral lower leg s/p Mohs and linear repair 11/15/22  - BCC, right post auricular s/p Mohs 12/5/2017     3.  Actinic keratosis  - Cryotherapy      4. Mildly dysplastic junctional nevus, lower back  - S/p excision 11/27/2015      5. Psoriasiform dermatitis, right earlobe  - S/p biopsy 1/2016  - No current flare or tx     ____________________________________________     Assessment & Plan:     # BCC, L lateral lower leg s/p Mohs and linear repair 11/15/22  Wound is healing appropriately.   No visible suture material.   Continue wound care until wound is healed over with new pink skin.   Recommended skin cancer screening follow up 6 months from last exam, she will schedule at the pod on her way out.     Staff:     Moshe Yañez DO    Department of Dermatology  [facility]: Phone: [phone], Fax:[phone]  [facility]: Phone: [phone], Fax: [phone]      ____________________________________________    CC: Derm Problem (Patient is here today for a 4 week wound check of the lower leg.)    HPI:  Ms. Lou Chaudhary is a(n) 67 year old female who presents today for follow-up  after Mohs surgery and linear repair for BCC on the L lateral lower leg. She notes some focal pain with palpation along the scar. She wonders if there is a suture lingering.     Patient is otherwise feeling well, without additional skin concerns.     Labs Reviewed:  N/A    Physical Exam:  Vitals: There were no vitals taken for this visit.  SKIN: Focused examination of left lateral lower leg was performed.  - linear surgical scar with  wound edges apposed, there are focal erosions on the surface of the scar.   - No other lesions of concern on areas examined.     Medications:  Current Outpatient Medications   Medication     acetaminophen (TYLENOL) 325 MG tablet     IBUPROFEN PO     No current facility-administered medications for this visit.      Past Medical History:   Patient Active Problem List   Diagnosis     History of melanoma     Idiopathic scoliosis     Hematochezia     Benign neoplasm of rectum     History of dysplastic nevus     Past Medical History:   Diagnosis Date     Malignant melanoma (H)      Scoliosis

## 2022-12-13 NOTE — NURSING NOTE
Chief Complaint   Patient presents with     Derm Problem     Patient is here today for a 4 week wound check of the lower leg.     Ginger PERES RN

## 2023-02-04 ENCOUNTER — MEDICAL CORRESPONDENCE (OUTPATIENT)
Dept: HEALTH INFORMATION MANAGEMENT | Facility: CLINIC | Age: 68
End: 2023-02-04

## 2023-03-18 NOTE — PROGRESS NOTES
Preceptor Attestation:   Patient seen and discussed with the resident. Assessment and plan reviewed with resident and agreed upon.   Supervising Physician:  Venus Chirinos MD  [facility]     You can access the FollowMyHealth Patient Portal offered by [facility] by registering at the following website: [email or web address]. By joining [employer]’s FollowMyHealth portal, you will also be able to view your health information using other applications (apps) compatible with our system.

## 2023-04-11 ENCOUNTER — OFFICE VISIT (OUTPATIENT)
Dept: DERMATOLOGY | Facility: CLINIC | Age: 68
End: 2023-04-11
Payer: COMMERCIAL

## 2023-04-11 DIAGNOSIS — L85.3 XEROSIS CUTIS: ICD-10-CM

## 2023-04-11 DIAGNOSIS — Z85.820 HISTORY OF MELANOMA: ICD-10-CM

## 2023-04-11 DIAGNOSIS — D22.9 MULTIPLE BENIGN NEVI: ICD-10-CM

## 2023-04-11 DIAGNOSIS — L57.0 ACTINIC KERATOSIS: Primary | ICD-10-CM

## 2023-04-11 PROCEDURE — 99214 OFFICE O/P EST MOD 30 MIN: CPT | Mod: 25 | Performed by: DERMATOLOGY

## 2023-04-11 PROCEDURE — 17000 DESTRUCT PREMALG LESION: CPT | Mod: GC | Performed by: DERMATOLOGY

## 2023-04-11 PROCEDURE — 17003 DESTRUCT PREMALG LES 2-14: CPT | Mod: GC | Performed by: DERMATOLOGY

## 2023-04-11 RX ORDER — AMMONIUM LACTATE 12 G/100G
LOTION TOPICAL 2 TIMES DAILY
Qty: 225 ML | Refills: 4 | Status: SHIPPED | OUTPATIENT
Start: 2023-04-11 | End: 2024-06-12

## 2023-04-11 ASSESSMENT — PAIN SCALES - GENERAL: PAINLEVEL: NO PAIN (0)

## 2023-04-11 NOTE — PROGRESS NOTES
[facility] Dermatology Note  Encounter Date: Apr 11, 2023  Office Visit     Dermatology Problem List:  1. Malignant melanoma, superficial spreading, right dorsal forearm  - Stage IA, Breslow's 0.42m, no mites, nonulcerated  - S/p WLE 12/8/2015      2. NMSC  - BCC, L lateral lower leg s/p Mohs and linear repair 11/15/22  - BCC, right post auricular s/p Mohs 12/5/2017     3.  Actinic keratosis  - Cryotherapy      4. Mildly dysplastic junctional nevus, lower back  - S/p excision 11/27/2015      5. Psoriasiform dermatitis, right earlobe  - S/p biopsy 1/2016  - No current flare or tx    ____________________________________________    Assessment & Plan:     # History of Melanoma in situ  # History of nonmelanoma skin cancer  No evidence of recurrent disease  - RTC 6 months for skin exam  - Sun protection, including sunscreen 30+ SPF    # Actinic Keratosis, R hand  Reviewed etiology and treatment options. Will plan for treatment with cryotherapy   - LN cryotherapy (see procedure note)    # Xerosis Cutis  Roughness on right forearm most consistent with xerosis. Recommend moisturizer with ammonium lactate, patient expressed understanding  - prescribed Ammonium lactate 12% lotion as needed for dryness    # Multiple Benign Nevi  # Seborrheic Keratosis  # Huynh Agioma  - Reassurance as to benign nature of these lesions    Procedures Performed:   - Cryotherapy procedure note, location(s): Right hand. After verbal consent and discussion of risks and benefits including, but not limited to, dyspigmentation/scar, blister, and pain, 2 lesion(s) was(were) treated with 1-2 mm freeze border for 1-2 cycles with liquid nitrogen. Post cryotherapy instructions were provided.      Follow-up: 6 month(s) in-person, or earlier for new or changing lesions    Staff and Resident:     Dk Whiteside MD  Internal Medicine - Dermatology (PGY-2)     I have seen and examined this patient and agree with the assessment and plan  "as documented in the resident's note, and was present for all procedures.    Hector Hathaway MD  Dermatology Attending    ____________________________________________    CC: Skin Check (Lou is here today for a skin check. She states \" I have a spot on my hand , behind my ears, and arms that concerns me today\")    HPI:  Ms. Lou Chaudhary is a(n) 67 year old female who presents today as a return patient for skin check. Prior history of melanoma of right forearm (2015) and BCC (most recently 11/2022). Notes two rough spots on the right hand, in addition to diffuse roughness on the right forearm. No other new, painful, growing, or changing lesions of concern today.     Labs Reviewed:  N/A    Physical Exam:  Vitals: There were no vitals taken for this visit.  SKIN: Full skin, which includes the head/face, both arms, chest, back, abdomen,both legs, genitalia and/or groin buttocks, digits and/or nails, was examined.  - There is an erythematous macule with overyling adherent scale on the right dorsal hand.  - There is a dome shaped bright red papule on the trunk and extremities.   - Multiple regular brown pigmented macules and papules are identified on the trunk and extremities.   - Scattered brown macules on sun exposed areas.  - There is a waxy stuck on tan to brown papule on the trunk and extremities.  - No other lesions of concern on areas examined.   - Scars examined, no evidence of recurrent disease    Medications:  Current Outpatient Medications   Medication     acetaminophen (TYLENOL) 325 MG tablet     No current facility-administered medications for this visit.      Past Medical History:   Patient Active Problem List   Diagnosis     History of melanoma     Idiopathic scoliosis     Hematochezia     Benign neoplasm of rectum     History of dysplastic nevus     Past Medical History:   Diagnosis Date     Malignant melanoma (H)      Scoliosis        CC No referring provider defined for this encounter. on close of this " encounter.

## 2023-04-11 NOTE — LETTER
4/11/2023       RE: Lou Chaudhary  [address] [address]     Dear Colleague,    Thank you for referring your patient, Lou Chaudhary, to the [facility] DERMATOLOGY [facility] [place] at [facility]. Please see a copy of my visit note below.    [facility] Dermatology Note  Encounter Date: Apr 11, 2023  Office Visit     Dermatology Problem List:  1. Malignant melanoma, superficial spreading, right dorsal forearm  - Stage IA, Breslow's 0.42m, no mites, nonulcerated  - S/p WLE 12/8/2015      2. NMSC  - BCC, L lateral lower leg s/p Mohs and linear repair 11/15/22  - BCC, right post auricular s/p Mohs 12/5/2017     3.  Actinic keratosis  - Cryotherapy      4. Mildly dysplastic junctional nevus, lower back  - S/p excision 11/27/2015      5. Psoriasiform dermatitis, right earlobe  - S/p biopsy 1/2016  - No current flare or tx    ____________________________________________    Assessment & Plan:     # History of Melanoma in situ  # History of nonmelanoma skin cancer  No evidence of recurrent disease  - RTC 6 months for skin exam  - Sun protection, including sunscreen 30+ SPF    # Actinic Keratosis, R hand  Reviewed etiology and treatment options. Will plan for treatment with cryotherapy   - LN cryotherapy (see procedure note)    # Xerosis Cutis  Roughness on right forearm most consistent with xerosis. Recommend moisturizer with ammonium lactate, patient expressed understanding  - Ammonium lactate 12% lotion as needed for dryness    # Multiple Benign Nevi  # Seborrheic Keratosis  # Cherry Agioma    Procedures Performed:   - Cryotherapy procedure note, location(s): Right hand. After verbal consent and discussion of risks and benefits including, but not limited to, dyspigmentation/scar, blister, and pain, 2 lesion(s) was(were) treated with 1-2 mm freeze border for 1-2 cycles with liquid nitrogen. Post cryotherapy instructions were  "provided.      Follow-up: 6 month(s) in-person, or earlier for new or changing lesions    Staff and Resident:     Dk Whiteside MD  Internal Medicine - Dermatology (PGY-2)   ____________________________________________    CC: Skin Check (Lou is here today for a skin check. She states \" I have a spot on my hand , behind my ears, and arms that concerns me today\")    HPI:  Ms. Lou Chaudhary is a(n) 67 year old female who presents today as a return patient for skin check. Prior history of melanoma of right forearm (2015) and BCC (most recently 11/2022). Notes two rough spots on the right hand, in addition to diffuse roughness on the right forearm. No other new, painful, growing, or changing lesions of concern today.     Labs Reviewed:  N/A    Physical Exam:  Vitals: There were no vitals taken for this visit.  SKIN: Full skin, which includes the head/face, both arms, chest, back, abdomen,both legs, genitalia and/or groin buttocks, digits and/or nails, was examined.  - There is an erythematous macule with overyling adherent scale on the right dorsal hand.  - There is a dome shaped bright red papule on the trunk and extremities.   - Multiple regular brown pigmented macules and papules are identified on the trunk and extremities.   - Scattered brown macules on sun exposed areas.  - There is a waxy stuck on tan to brown papule on the trunk and extremities.  - No other lesions of concern on areas examined.   - Scars examined, no evidence of recurrent disease    Medications:  Current Outpatient Medications   Medication    acetaminophen (TYLENOL) 325 MG tablet     No current facility-administered medications for this visit.      Past Medical History:   Patient Active Problem List   Diagnosis    History of melanoma    Idiopathic scoliosis    Hematochezia    Benign neoplasm of rectum    History of dysplastic nevus     Past Medical History:   Diagnosis Date    Malignant melanoma (H)     Scoliosis        CC No referring " provider defined for this encounter. on close of this encounter.

## 2023-04-11 NOTE — NURSING NOTE
"Dermatology Rooming Note    Lou Chaudhary's goals for this visit include:   Chief Complaint   Patient presents with     Skin Check     Lou is here today for a skin check. She states \" I have a spot on my hand , behind my ears, and arms that concerns me today\"   Nancy Molina, RMA  "

## 2023-04-11 NOTE — PATIENT INSTRUCTIONS
Plan:  - Ammonium lactate for the rough, dry skin    Cryotherapy    What is it?  Use of a very cold liquid, such as liquid nitrogen, to freeze and destroy abnormal skin cells that need to be removed    What should I expect?  Tenderness and redness  A small blister that might grow and fill with dark purple blood. There may be crusting.  More than one treatment may be needed if the lesions do not go away.    How do I care for the treated area?  Gently wash the area with your hands when bathing.  Use a thin layer of Vaseline to help with healing. You may use a Band-Aid.   The area should heal within 7-10 days and may leave behind a pink or lighter color.   Do not use an antibiotic or Neosporin ointment.   You may take acetaminophen (Tylenol) for pain.     Call your doctor if you have:  Severe pain  Signs of infection (warmth, redness, cloudy yellow drainage, and or a bad smell)  Questions or concerns    Who should I call with questions?      [facility]: [phone]      [facility]: [phone]      For urgent needs outside of business hours call the [facility] at [phone] and ask for the dermatology resident on call       Patient Education     Checking for Skin Cancer  You can find cancer early by checking your skin each month. There are 3 kinds of skin cancer. They are melanoma, basal cell carcinoma, and squamous cell carcinoma. Doing monthly skin checks is the best way to find new marks or skin changes. Follow the instructions below for checking your skin.   The ABCDEs of checking moles for melanoma   Check your moles or growths for signs of melanoma using ABCDE:   Asymmetry: the sides of the mole or growth don t match  Border: the edges are ragged, notched, or blurred  Color: the color within the mole or growth varies  Diameter: the mole or growth is larger than 6 mm (size of a pencil eraser)  Evolving: the size, shape, or color of the mole  or growth is changing (evolving is not shown in the images below)    Checking for other types of skin cancer  Basal cell carcinoma or squamous cell carcinoma have symptoms such as:     A spot or mole that looks different from all other marks on your skin  Changes in how an area feels, such as itching, tenderness, or pain  Changes in the skin's surface, such as oozing, bleeding, or scaliness  A sore that does not heal  New swelling or redness beyond the border of a mole    Who s at risk?  Anyone can get skin cancer. But you are at greater risk if you have:   Fair skin, light-colored hair, or light-colored eyes  Many moles or abnormal moles on your skin  A history of sunburns from sunlight or tanning beds  A family history of skin cancer  A history of exposure to radiation or chemicals  A weakened immune system  If you have had skin cancer in the past, you are at risk for recurring skin cancer.   How to check your skin  Do your monthly skin checkups in front of a full-length mirror. Check all parts of your body, including your:   Head (ears, face, neck, and scalp)  Torso (front, back, and sides)  Arms (tops, undersides, upper, and lower armpits)  Hands (palms, backs, and fingers, including under the nails)  Buttocks and genitals  Legs (front, back, and sides)  Feet (tops, soles, toes, including under the nails, and between toes)  If you have a lot of moles, take digital photos of them each month. Make sure to take photos both up close and from a distance. These can help you see if any moles change over time.   Most skin changes are not cancer. But if you see any changes in your skin, call your doctor right away. Only he or she can diagnose a problem. If you have skin cancer, seeing your doctor can be the first step toward getting the treatment that could save your life.   [facility] [employer] last reviewed this educational content on 4/1/2019 2000-2020 [employer]. [address] [place] [address], [place] [address]. All  rights reserved. This information is not intended as a substitute for professional medical care. Always follow your healthcare professional's instructions.       When should I call my doctor?  If you are worsening or not improving, please, contact us or seek urgent care as noted below.     Who should I call with questions (adults)?  [facility] (adult and pediatric): [phone]  [facility] (adult): [phone]  For urgent needs outside of business hours call the [facility] at [phone] and ask for the dermatology resident on call to be paged  If this is a medical emergency and you are unable to reach an ER, Call [phone]    Who should I call with questions (pediatric)?  [facility]- Pediatric Dermatology  Dr. Janet Rios, Dr. Jason Lovell, Dr. Suki Alvarado, HOLLY Cooper, Dr. Kassie Bunch, Dr. Nancy Johnson & Dr. Dimas Guerrero  Non-urgent nurse triage line; [phone]- Tamra and Sammie WEAVER Care Coordinators   [place] (/Complex ) [phone]    If you need a prescription refill, please contact your pharmacy. Refills are approved or denied by our Physicians during normal business hours, Monday through Fridays  Per office policy, refills will not be granted if you have not been seen within the past year (or sooner depending on your child's condition)    Scheduling Information:  Pediatric Appointment Scheduling and Call Center [phone]  Radiology Scheduling- [phone]  Sedation Unit Scheduling- [phone]  [place] Scheduling- General [phone]; Pediatric Dermatology [phone]  Main  Services: [phone]  Bhutanese: [phone]  Chadian: [phone]  Hmong/Mauritian/Indonesian: [phone]  Preadmission Nursing Department Fax Number: [phone] (Fax all pre-operative paperwork to this number)    For urgent matters arising during evenings, weekends, or  holidays that cannot wait for normal business hours please call [phone] and ask for the dermatology resident on call to be paged.

## 2023-05-06 PROBLEM — L57.0 ACTINIC KERATOSIS: Status: ACTIVE | Noted: 2023-05-06

## 2023-05-06 PROBLEM — D22.9 MULTIPLE BENIGN NEVI: Status: ACTIVE | Noted: 2023-05-06

## 2023-05-06 PROBLEM — L85.3 XEROSIS CUTIS: Status: ACTIVE | Noted: 2023-05-06

## 2023-05-11 NOTE — TELEPHONE ENCOUNTER
----- Message from Ashley Grijalva MD sent at 11/22/2017  9:34 AM CST -----  Regarding: clear and brilliant  Can you call and talk her through for face and arms. She would do at least 3  
I left a message for patient to call [facility].  Cathy Demarco RN    
I spoke with Lou and she is no longer interested in laser treatment.  Cathy Demarco RN    
Detail Level: Simple
Price (Do Not Change): 0.00
Instructions: This plan will send the code FBSD to the PM system.  DO NOT or CHANGE the price.
H Plasty Text: Given the location of the defect, shape of the defect and the proximity to free margins a H-plasty was deemed most appropriate for repair. Using a sterile surgical marker, the appropriate advancement arms of the H-plasty were drawn incorporating the defect and placing the expected incisions within the relaxed skin tension lines where possible. The area thus outlined was incised deep to adipose tissue with a #15 scalpel blade. The skin margins were undermined to an appropriate distance in all directions utilizing iris scissors.  The opposing advancement arms were then advanced and carried over into place in opposite direction and anchored with interrupted buried subcutaneous sutures.

## 2023-11-01 ENCOUNTER — OFFICE VISIT (OUTPATIENT)
Dept: DERMATOLOGY | Facility: CLINIC | Age: 68
End: 2023-11-01
Payer: COMMERCIAL

## 2023-11-01 DIAGNOSIS — C44.719 BASAL CELL CARCINOMA (BCC) OF LEFT LOWER LEG: ICD-10-CM

## 2023-11-01 DIAGNOSIS — L82.0 INFLAMED SEBORRHEIC KERATOSIS: ICD-10-CM

## 2023-11-01 DIAGNOSIS — Z85.828 HISTORY OF BASAL CELL CARCINOMA OF SKIN: ICD-10-CM

## 2023-11-01 DIAGNOSIS — Z86.006 HISTORY OF MELANOMA IN SITU: ICD-10-CM

## 2023-11-01 DIAGNOSIS — L82.1 SEBORRHEIC KERATOSIS: ICD-10-CM

## 2023-11-01 DIAGNOSIS — L57.0 ACTINIC KERATOSIS: Primary | ICD-10-CM

## 2023-11-01 DIAGNOSIS — D22.9 MULTIPLE BENIGN NEVI: ICD-10-CM

## 2023-11-01 PROCEDURE — 17000 DESTRUCT PREMALG LESION: CPT | Mod: XS | Performed by: DERMATOLOGY

## 2023-11-01 PROCEDURE — 17110 DESTRUCTION B9 LES UP TO 14: CPT | Mod: GC | Performed by: DERMATOLOGY

## 2023-11-01 PROCEDURE — 99213 OFFICE O/P EST LOW 20 MIN: CPT | Mod: 25 | Performed by: DERMATOLOGY

## 2023-11-01 PROCEDURE — 17003 DESTRUCT PREMALG LES 2-14: CPT | Mod: XS | Performed by: DERMATOLOGY

## 2023-11-01 RX ORDER — EPINEPHRINE 0.3 MG/.3ML
1 INJECTION SUBCUTANEOUS
COMMUNITY
Start: 2023-09-07

## 2023-11-01 ASSESSMENT — PAIN SCALES - GENERAL: PAINLEVEL: NO PAIN (0)

## 2023-11-01 NOTE — LETTER
11/1/2023       RE: Lou Chaudhary  [address] [address]     Dear Colleague,    Thank you for referring your patient, Lou Chaudhary, to the [facility] [place] [facility]. Please see a copy of my visit note below.    [facility] Dermatology Note  Encounter Date: Nov 1, 2023  Office Visit     Dermatology Problem List:  1. 1. Malignant melanoma, superficial spreading, right dorsal forearm  - Stage IA, Breslow's 0.42m, no mites, nonulcerated  - S/p WLE 12/8/2015      2. NMSC  - BCC, L lateral lower leg s/p Mohs and linear repair 11/15/22  - BCC, right post auricular s/p Mohs 12/5/2017     3.  Actinic keratosis  - Cryotherapy today       4. Mildly dysplastic junctional nevus, lower back  - S/p excision 11/27/2015      5. Psoriasiform dermatitis, right earlobe  - S/p biopsy 1/2016  - No current flare or tx    ____________________________________________    Assessment & Plan:     # History of Melanoma in situ  # History of nonmelanoma skin cancer  No evidence of recurrent disease  - RTC 6 months for skin exam  - Sun protection, including sunscreen 30+ SPF     # Actinic Keratosis, R hand and L forearm  Reviewed etiology and treatment options. Will plan for treatment with cryotherapy.   - LN cryotherapy (see procedure note)     # Xerosis Cutis  Roughness on right forearm most consistent with xerosis. Recommend moisturizer with ammonium lactate, patient expressed understanding  - continue Ammonium lactate 12% lotion as needed for dryness     # Multiple Benign Nevi  # Seborrheic Keratosis  # Huynh Agioma  - Reassurance as to benign nature of these lesions    # inflamed SK left neck- cryotherapy performed today      Procedures Performed:   - Cryotherapy procedure note, location(s): R hand, L forearm, L anterior neck. After verbal consent and discussion of risks and benefits including, but not limited to,  "dyspigmentation/scar, blister, and pain, 4 lesion(s) was(were) treated with 1-2 mm freeze border for 1-2 cycles with liquid nitrogen. Post cryotherapy instructions were provided.    Follow-up: 6 month(s) in-person, or earlier for new or changing lesions    Staff and Resident:     Joellen Martinez DO PGY2  I was present for the entire procedure. Melinda Fierro MD   I, Melinda Fierro MD, saw this patient with the resident and agree with the resident s findings and plan of care as documented in the resident s note.    ____________________________________________    CC: Derm Problem (FBSE- spot of concern on lip, skin tag came back on neck, spots on hands and arms)    HPI:  Ms. Lou Chaudhary is a(n) 67 year old female who presents today as a return patient for skin check. Prior history of melanoma of right forearm (2015) and BCC (most recently 11/2022). Notes two rough spots on the right hand, in addition to diffuse roughness on the right forearm. Return of \"skin tag\" on left anterior neck with increased size. No other new, painful, growing, or changing lesions of concern today.      Patient is otherwise feeling well, without additional skin concerns.    Labs Reviewed:  N/A    Physical Exam:  Vitals: There were no vitals taken for this visit.  SKIN: Total skin excluding the undergarment areas but including the buttocks was performed. The exam included the head/face, neck, both arms, chest, back, abdomen, both legs, digits and/or nails.   - There is macular erythema of the bilateral forearms and left anterior neck, with mild flaky white scale.  - There is an erythematous macule with overyling adherent scale on the right hand.   - waxy papule of neck  - There are dome shaped bright red papules on the abdomen.   - Scar with no nodularity or pigment right forearm  - scars at previous skin cancer sites with no recurrence  - no cervical, submandibular or axillary adenopathy  - No other lesions of concern on areas " examined.     Medications:  Current Outpatient Medications   Medication    acetaminophen (TYLENOL) 325 MG tablet    ammonium lactate (LAC-HYDRIN) 12 % external lotion    EPINEPHrine (ANY BX GENERIC EQUIV) 0.3 MG/0.3ML injection 2-pack     No current facility-administered medications for this visit.      Past Medical History:   Patient Active Problem List   Diagnosis    History of melanoma    Idiopathic scoliosis    Hematochezia    Benign neoplasm of rectum    History of dysplastic nevus    Xerosis cutis    Multiple benign nevi    Actinic keratosis     Past Medical History:   Diagnosis Date    Malignant melanoma (H)     Scoliosis        CC Melinda Fierro MD  [address] [place] [phone] on close of this encounter.

## 2023-11-01 NOTE — PROGRESS NOTES
[facility] Dermatology Note  Encounter Date: Nov 1, 2023  Office Visit     Dermatology Problem List:  1. 1. Malignant melanoma, superficial spreading, right dorsal forearm  - Stage IA, Breslow's 0.42m, no mites, nonulcerated  - S/p WLE 12/8/2015      2. NMSC  - BCC, L lateral lower leg s/p Mohs and linear repair 11/15/22  - BCC, right post auricular s/p Mohs 12/5/2017     3.  Actinic keratosis  - Cryotherapy today       4. Mildly dysplastic junctional nevus, lower back  - S/p excision 11/27/2015      5. Psoriasiform dermatitis, right earlobe  - S/p biopsy 1/2016  - No current flare or tx    ____________________________________________    Assessment & Plan:     # History of Melanoma in situ  # History of nonmelanoma skin cancer  No evidence of recurrent disease  - RTC 6 months for skin exam  - Sun protection, including sunscreen 30+ SPF     # Actinic Keratosis, R hand and L forearm  Reviewed etiology and treatment options. Will plan for treatment with cryotherapy.   - LN cryotherapy (see procedure note)     # Xerosis Cutis  Roughness on right forearm most consistent with xerosis. Recommend moisturizer with ammonium lactate, patient expressed understanding  - continue Ammonium lactate 12% lotion as needed for dryness     # Multiple Benign Nevi  # Seborrheic Keratosis  # Huynh Agioma  - Reassurance as to benign nature of these lesions    # inflamed SK left neck- cryotherapy performed today      Procedures Performed:   - Cryotherapy procedure note, location(s): R hand, L forearm, L anterior neck. After verbal consent and discussion of risks and benefits including, but not limited to, dyspigmentation/scar, blister, and pain, 4 lesion(s) was(were) treated with 1-2 mm freeze border for 1-2 cycles with liquid nitrogen. Post cryotherapy instructions were provided.    Follow-up: 6 month(s) in-person, or earlier for new or changing lesions    Staff and Resident:     Joellen Martinez DO PGY2  I was present  "for the entire procedure. Melinda Fierro MD   I, Melinda Fierro MD, saw this patient with the resident and agree with the resident s findings and plan of care as documented in the resident s note.    ____________________________________________    CC: Derm Problem (FBSE- spot of concern on lip, skin tag came back on neck, spots on hands and arms)    HPI:  Ms. Lou Chaudhary is a(n) 67 year old female who presents today as a return patient for skin check. Prior history of melanoma of right forearm (2015) and BCC (most recently 11/2022). Notes two rough spots on the right hand, in addition to diffuse roughness on the right forearm. Return of \"skin tag\" on left anterior neck with increased size. No other new, painful, growing, or changing lesions of concern today.      Patient is otherwise feeling well, without additional skin concerns.    Labs Reviewed:  N/A    Physical Exam:  Vitals: There were no vitals taken for this visit.  SKIN: Total skin excluding the undergarment areas but including the buttocks was performed. The exam included the head/face, neck, both arms, chest, back, abdomen, both legs, digits and/or nails.   - There is macular erythema of the bilateral forearms and left anterior neck, with mild flaky white scale.  - There is an erythematous macule with overyling adherent scale on the right hand.   - waxy papule of neck  - There are dome shaped bright red papules on the abdomen.   - Scar with no nodularity or pigment right forearm  - scars at previous skin cancer sites with no recurrence  - no cervical, submandibular or axillary adenopathy  - No other lesions of concern on areas examined.     Medications:  Current Outpatient Medications   Medication    acetaminophen (TYLENOL) 325 MG tablet    ammonium lactate (LAC-HYDRIN) 12 % external lotion    EPINEPHrine (ANY BX GENERIC EQUIV) 0.3 MG/0.3ML injection 2-pack     No current facility-administered medications for this visit.      Past Medical History: "   Patient Active Problem List   Diagnosis    History of melanoma    Idiopathic scoliosis    Hematochezia    Benign neoplasm of rectum    History of dysplastic nevus    Xerosis cutis    Multiple benign nevi    Actinic keratosis     Past Medical History:   Diagnosis Date    Malignant melanoma (H)     Scoliosis        CC Melinda Feirro MD  [address]  [place] [address] on close of this encounter.

## 2023-11-01 NOTE — PATIENT INSTRUCTIONS
Cryotherapy    What is it?  Use of a very cold liquid, such as liquid nitrogen, to freeze and destroy abnormal skin cells that need to be removed    What should I expect?  Tenderness and redness  A small blister that might grow and fill with dark purple blood. There may be crusting.  More than one treatment may be needed if the lesions do not go away.    How do I care for the treated area?  Gently wash the area with your hands when bathing.  Use a thin layer of Vaseline to help with healing. You may use a Band-Aid.   The area should heal within 7-10 days and may leave behind a pink or lighter color.   Do not use an antibiotic or Neosporin ointment.   You may take acetaminophen (Tylenol) for pain.     Call your doctor if you have:  Severe pain  Signs of infection (warmth, redness, cloudy yellow drainage, and or a bad smell)  Questions or concerns    Who should I call with questions?      [facility]: [phone]      [facility]: [phone]      For urgent needs outside of business hours call the [facility] at [phone] and ask for the dermatology resident on call

## 2023-11-02 PROBLEM — Z86.006 HISTORY OF MELANOMA IN SITU: Status: ACTIVE | Noted: 2023-11-02

## 2024-01-07 ENCOUNTER — HEALTH MAINTENANCE LETTER (OUTPATIENT)
Age: 69
End: 2024-01-07

## 2024-05-13 ENCOUNTER — OFFICE VISIT (OUTPATIENT)
Dept: DERMATOLOGY | Facility: CLINIC | Age: 69
End: 2024-05-13
Attending: DERMATOLOGY
Payer: COMMERCIAL

## 2024-05-13 DIAGNOSIS — L82.1 SEBORRHEIC KERATOSIS: ICD-10-CM

## 2024-05-13 DIAGNOSIS — L57.0 ACTINIC KERATOSIS: Primary | ICD-10-CM

## 2024-05-13 DIAGNOSIS — D48.5 NEOPLASM OF UNCERTAIN BEHAVIOR OF SKIN: ICD-10-CM

## 2024-05-13 DIAGNOSIS — Z85.828 HISTORY OF BASAL CELL CARCINOMA OF SKIN: ICD-10-CM

## 2024-05-13 DIAGNOSIS — Z85.820 HISTORY OF MELANOMA: ICD-10-CM

## 2024-05-13 DIAGNOSIS — C44.712 BASAL CELL CARCINOMA (BCC) OF RIGHT LOWER LEG: ICD-10-CM

## 2024-05-13 DIAGNOSIS — D22.9 MULTIPLE BENIGN NEVI: ICD-10-CM

## 2024-05-13 PROCEDURE — 11102 TANGNTL BX SKIN SINGLE LES: CPT | Performed by: DERMATOLOGY

## 2024-05-13 PROCEDURE — 88342 IMHCHEM/IMCYTCHM 1ST ANTB: CPT | Mod: 26 | Performed by: DERMATOLOGY

## 2024-05-13 PROCEDURE — 88342 IMHCHEM/IMCYTCHM 1ST ANTB: CPT | Mod: TC | Performed by: DERMATOLOGY

## 2024-05-13 PROCEDURE — 88305 TISSUE EXAM BY PATHOLOGIST: CPT | Mod: 26 | Performed by: DERMATOLOGY

## 2024-05-13 PROCEDURE — 99213 OFFICE O/P EST LOW 20 MIN: CPT | Mod: 25 | Performed by: DERMATOLOGY

## 2024-05-13 PROCEDURE — 17000 DESTRUCT PREMALG LESION: CPT | Mod: XS | Performed by: DERMATOLOGY

## 2024-05-13 ASSESSMENT — PAIN SCALES - GENERAL: PAINLEVEL: NO PAIN (0)

## 2024-05-13 NOTE — PROGRESS NOTES
[facility] Dermatology Note  Encounter Date: May 13, 2024  Office Visit     Dermatology Problem List:  1. 1. Malignant melanoma, superficial spreading, right dorsal forearm  - Stage IA, Breslow's 0.42m, no mites, nonulcerated  - S/p WLE 12/8/2015      2. NMSC  - BCC, L lateral lower leg s/p Mohs and linear repair 11/15/22  - BCC, right post auricular s/p Mohs 12/5/2017     3.  Actinic keratosis  - Cryotherapy today       4. Mildly dysplastic junctional nevus, lower back  - S/p excision 11/27/2015      5. Psoriasiform dermatitis, right earlobe  - S/p biopsy 1/2016  - No current flare or tx    ____________________________________________    Assessment & Plan:     # AK left upper cutaneous lip.   - Cryotherapy performed today (see procedure note(s) below).    # NUB right lower medial leg.   - Shave biopsy performed today (see procedure note(s) below).    # History of melanoma and history of BCC-NERD    # benign findings: nevi, lentigo, seborrheic keratoses, cherry angiomas      Procedures Performed:   - Cryotherapy procedure note, location(s): left upper cutaneous lip. After verbal consent and discussion of risks and benefits including, but not limited to, dyspigmentation/scar, blister, and pain, one lesion(s) was(were) treated with 1-2 mm freeze border for 1-2 cycles with liquid nitrogen. Post cryotherapy instructions were provided.  - Shave biopsy procedure note, location(s): right lower medial leg. After discussion of benefits and risks including but not limited to bleeding, infection, scar, incomplete removal, recurrence, and non-diagnostic biopsy, verbal consent and photographs were obtained. The area was cleaned with isopropyl alcohol. 0.5mL of 1% lidocaine with epinephrine was injected to obtain adequate anesthesia of lesion(s). Shave biopsy at site(s) performed. Hemostasis was achieved with aluminium chloride. Petrolatum ointment and a sterile dressing were applied. The patient tolerated the  procedure and no complications were noted. The patient was provided with verbal and written post care instructions.     Follow-up: 6 month(s) in-person, or earlier for new or changing lesions    Staff:     Melinda Fierro MD  ____________________________________________    CC: Derm Problem (FBSE- spot of concern L jaw, R chest under bra, spots on R leg, look at lip)    HPI:  Ms. Luo Chaudhary is a(n) 68 year old female who presents today as a return patient for a melanoma skin check.  Notes some bumps and rough areas of the face, a new bump on the right flank and a red area on the right lower leg.  No tender or bleeding lesions.      Patient is otherwise feeling well, without additional skin concerns.     Labs Reviewed:  N/A    Physical Exam:  Vitals: There were no vitals taken for this visit.  SKIN: Total skin excluding the undergarment areas but including the buttocks was performed. The exam included the head/face, neck, both arms, chest, back, abdomen, both legs, digits and/or nails.   - scars with NERD  - lentigo  - waxy stuck on papules  - cherry angiomas  - small gritty papule left upper cutaneous lip  - pink patch right lower medial leg with atrophic center  - No other lesions of concern on areas examined.     Medications:  Current Outpatient Medications   Medication Sig Dispense Refill    acetaminophen (TYLENOL) 325 MG tablet Take 325-650 mg by mouth as needed      EPINEPHrine (ANY BX GENERIC EQUIV) 0.3 MG/0.3ML injection 2-pack Inject 1 Pen into the muscle      ammonium lactate (LAC-HYDRIN) 12 % external lotion Apply topically 2 times daily Apply to dry skin up to twice daily 225 mL 4     No current facility-administered medications for this visit.      Past Medical History:   Patient Active Problem List   Diagnosis    History of melanoma    Idiopathic scoliosis    Hematochezia    Benign neoplasm of rectum    History of dysplastic nevus    Xerosis cutis    Multiple benign nevi    Actinic keratosis     History of melanoma in situ     Past Medical History:   Diagnosis Date    Malignant melanoma (H)     Scoliosis        CC Melinda Fierro MD  [address] [facility] [address]  [place] [address] on close of this encounter.

## 2024-05-13 NOTE — PROGRESS NOTES
Lidocaine-epinephrine 1-1:949146 % injection   3mL once for one use, starting 5/13/2024 ending 5/13/2024,  2mL disp, R-0, injection  Injected by Nilda Jain EMT

## 2024-05-13 NOTE — NURSING NOTE
Dermatology Rooming Note    Lou Chaudhary's goals for this visit include:   Chief Complaint   Patient presents with    Derm Problem     FBSE- spot of concern L jaw, R chest under bra, spots on R leg, look at lip     Nilda Jain, EMT

## 2024-05-13 NOTE — PATIENT INSTRUCTIONS
Cryotherapy    What is it?  Use of a very cold liquid, such as liquid nitrogen, to freeze and destroy abnormal skin cells that need to be removed    What should I expect?  Tenderness and redness  A small blister that might grow and fill with dark purple blood. There may be crusting.  More than one treatment may be needed if the lesions do not go away.    How do I care for the treated area?  Gently wash the area with your hands when bathing.  Use a thin layer of Vaseline to help with healing. You may use a Band-Aid.   The area should heal within 7-10 days and may leave behind a pink or lighter color.   Do not use an antibiotic or Neosporin ointment.   You may take acetaminophen (Tylenol) for pain.     Call your doctor if you have:  Severe pain  Signs of infection (warmth, redness, cloudy yellow drainage, and or a bad smell)  Questions or concerns    Who should I call with questions?      [facility]: [phone]      [facility]: [phone]      For urgent needs outside of business hours call the [facility] at [phone] and ask for the dermatology resident on call       Wound Care After a Biopsy    What is a skin biopsy?  A skin biopsy allows the doctor to examine a very small piece of tissue under the microscope to determine the diagnosis and the best treatment for the skin condition. A local anesthetic (numbing medicine) is injected with a very small needle into the skin area to be tested. A small piece of skin is taken from the area. Sometimes a suture (stitch) is used.     What are the risks of a skin biopsy?  I will experience scar, bleeding, swelling, pain, crusting and redness. I may experience incomplete removal or recurrence. Risks of this procedure are excessive bleeding, bruising, infection, nerve damage, numbness, thick (hypertrophic or keloidal) scar and non-diagnostic biopsy.    How should I care for my wound for the first 24  hours?  Keep the wound dry and covered for 24 hours  If it bleeds, hold direct pressure on the area for 15 minutes. If bleeding does not stop, call us or go to the emergency room  Avoid strenuous exercise the first 1-2 days or as your doctor instructs you    How should I care for the wound after 24 hours?  After 24 hours, remove the bandage  You may bathe or shower as normal  If you had a scalp biopsy, you can shampoo as usual and can use shower water to clean the biopsy site daily  Clean the wound once a day with gentle soap and water  Do not scrub, be gentle  Apply white petroleum/Vaseline after cleaning the wound with a cotton swab or a clean finger, and keep the site covered with a Bandaid /bandage. Bandages are not necessary with a scalp biopsy  If you are unable to cover the site with a Bandaid /bandage, re-apply ointment 2-3 times a day to keep the site moist. Moisture will help with healing  Avoid strenuous activity for first 1-2 days  Avoid lakes, rivers, pools, and oceans until the stitches are removed or the site is healed    How do I clean my wound?  Wash hands thoroughly with soap or use hand  before all wound care  Clean the wound with gentle soap and water  Apply white petroleum/Vaseline  to wound after it is clean  Replace the Bandaid /bandage to keep the wound covered for the first few days or as instructed by your doctor  If you had a scalp biopsy, warm shower water to the area on a daily basis should suffice    What should I use to clean my wound?   Cotton-tipped applicators (Qtips )  White petroleum jelly (Vaseline ). Use a clean new container and use Q-tips to apply.  Bandaids  as needed  Gentle soap     How should I care for my wound long term?  Do not get your wound dirty  Keep up with wound care for one week or until the area is healed.  If you have stitches, stitches need to be removed in  days. You may return to our clinic for this or you may have it done locally at your doctor s  office.  A small scab will form and fall off by itself when the area is completely healed. The area will be red and will become pink in color as it heals. Sun protection is very important for how your scar will turn out. Sunscreen with an SPF 30 or greater is recommended once the area is healed.  You should have some soreness but it should be mild and slowly go away over several days. Talk to your doctor about using tylenol for pain,    When should I call my doctor?  If you have increased:   Pain or swelling  Pus or drainage (clear or slightly yellow drainage is ok)  Temperature over 100F  Spreading redness or warmth around wound    When will I hear about my results?  The biopsy results can take 2 weeks to come back.  Your results will automatically release to [facility] before your provider has even reviewed them.  The clinic will call you with the results, send you a [facility] message, or have you schedule a follow-up clinic or phone time to discuss the results.  Contact our clinics if you do not hear from us in 2 weeks.    Who should I call with questions?  [facility]: [phone]  [facility]: [phone]  For urgent needs outside of business hours call the [facility] at [phone] and ask for the dermatology resident on call

## 2024-05-13 NOTE — LETTER
5/13/2024       RE: Lou Chaudhary  [address] [address]     Dear Colleague,    Thank you for referring your patient, Lou Chaudhary, to the [facility] [place] at [facility]. Please see a copy of my visit note below.    [facility] Dermatology Note  Encounter Date: May 13, 2024  Office Visit     Dermatology Problem List:  1. 1. Malignant melanoma, superficial spreading, right dorsal forearm  - Stage IA, Breslow's 0.42m, no mites, nonulcerated  - S/p WLE 12/8/2015      2. NMSC  - BCC, L lateral lower leg s/p Mohs and linear repair 11/15/22  - BCC, right post auricular s/p Mohs 12/5/2017     3.  Actinic keratosis  - Cryotherapy today       4. Mildly dysplastic junctional nevus, lower back  - S/p excision 11/27/2015      5. Psoriasiform dermatitis, right earlobe  - S/p biopsy 1/2016  - No current flare or tx    ____________________________________________    Assessment & Plan:     # AK left upper cutaneous lip.   - Cryotherapy performed today (see procedure note(s) below).    # NUB right lower medial leg.   - Shave biopsy performed today (see procedure note(s) below).    # History of melanoma and history of BCC-NERD    # benign findings: nevi, lentigo, seborrheic keratoses, cherry angiomas      Procedures Performed:   - Cryotherapy procedure note, location(s): left upper cutaneous lip. After verbal consent and discussion of risks and benefits including, but not limited to, dyspigmentation/scar, blister, and pain, one lesion(s) was(were) treated with 1-2 mm freeze border for 1-2 cycles with liquid nitrogen. Post cryotherapy instructions were provided.  - Shave biopsy procedure note, location(s): right lower medial leg. After discussion of benefits and risks including but not limited to bleeding, infection, scar, incomplete removal, recurrence, and non-diagnostic biopsy, verbal consent and photographs were  obtained. The area was cleaned with isopropyl alcohol. 0.5mL of 1% lidocaine with epinephrine was injected to obtain adequate anesthesia of lesion(s). Shave biopsy at site(s) performed. Hemostasis was achieved with aluminium chloride. Petrolatum ointment and a sterile dressing were applied. The patient tolerated the procedure and no complications were noted. The patient was provided with verbal and written post care instructions.     Follow-up: 6 month(s) in-person, or earlier for new or changing lesions    Staff:     Melinda Fierro MD  ____________________________________________    CC: Derm Problem (FBSE- spot of concern L jaw, R chest under bra, spots on R leg, look at lip)    HPI:  Ms. Lou Chaudhary is a(n) 68 year old female who presents today as a return patient for a melanoma skin check.  Notes some bumps and rough areas of the face, a new bump on the right flank and a red area on the right lower leg.  No tender or bleeding lesions.      Patient is otherwise feeling well, without additional skin concerns.     Labs Reviewed:  N/A    Physical Exam:  Vitals: There were no vitals taken for this visit.  SKIN: Total skin excluding the undergarment areas but including the buttocks was performed. The exam included the head/face, neck, both arms, chest, back, abdomen, both legs, digits and/or nails.   - scars with NERD  - lentigo  - waxy stuck on papules  - cherry angiomas  - small gritty papule left upper cutaneous lip  - pink patch right lower medial leg with atrophic center  - No other lesions of concern on areas examined.     Medications:  Current Outpatient Medications   Medication Sig Dispense Refill    acetaminophen (TYLENOL) 325 MG tablet Take 325-650 mg by mouth as needed      EPINEPHrine (ANY BX GENERIC EQUIV) 0.3 MG/0.3ML injection 2-pack Inject 1 Pen into the muscle      ammonium lactate (LAC-HYDRIN) 12 % external lotion Apply topically 2 times daily Apply to dry skin up to twice daily 225 mL 4     No  current facility-administered medications for this visit.      Past Medical History:   Patient Active Problem List   Diagnosis    History of melanoma    Idiopathic scoliosis    Hematochezia    Benign neoplasm of rectum    History of dysplastic nevus    Xerosis cutis    Multiple benign nevi    Actinic keratosis    History of melanoma in situ     Past Medical History:   Diagnosis Date    Malignant melanoma (H)     Scoliosis        CC Melinda Fierro MD  [address]  [place] [address] on close of this encounter.     Lidocaine-epinephrine 1-1:808808 % injection   3mL once for one use, starting 5/13/2024 ending 5/13/2024,  2mL disp, R-0, injection  Injected by Nilda Jain, EMT

## 2024-05-23 ENCOUNTER — TELEPHONE (OUTPATIENT)
Dept: DERMATOLOGY | Facility: CLINIC | Age: 69
End: 2024-05-23
Payer: COMMERCIAL

## 2024-05-23 NOTE — TELEPHONE ENCOUNTER
Left patient a voicemail to schedule mohs for   BCC right lower leg        with Dr. Yañez. Return pt. Provided my direct phone number.    Sophia Alcaraz on 5/23/2024 at 8:53 AM

## 2024-05-23 NOTE — TELEPHONE ENCOUNTER
Called patient to schedule surgery with Dr. Yañez    Date of Surgery: 07/16    Surgery type: Mohs    Consult scheduled: No    Has patient had mohs with us before? Yes    Additional comments: pt requested date and time      Sophia Alcaraz on 5/23/2024 at 11:13 AM     Prescription approved per [facility] Refill Protocol.  Mónica Maravilla RN

## 2024-06-12 ENCOUNTER — OFFICE VISIT (OUTPATIENT)
Dept: FAMILY MEDICINE | Facility: CLINIC | Age: 69
End: 2024-06-12
Payer: COMMERCIAL

## 2024-06-12 VITALS
HEART RATE: 88 BPM | RESPIRATION RATE: 16 BRPM | SYSTOLIC BLOOD PRESSURE: 130 MMHG | BODY MASS INDEX: 25.81 KG/M2 | OXYGEN SATURATION: 99 % | WEIGHT: 164.8 LBS | DIASTOLIC BLOOD PRESSURE: 59 MMHG | TEMPERATURE: 97.8 F

## 2024-06-12 DIAGNOSIS — Z29.11 NEED FOR VACCINATION AGAINST RESPIRATORY SYNCYTIAL VIRUS: ICD-10-CM

## 2024-06-12 DIAGNOSIS — R03.0 ELEVATED BLOOD PRESSURE READING WITHOUT DIAGNOSIS OF HYPERTENSION: Primary | ICD-10-CM

## 2024-06-12 DIAGNOSIS — Z11.59 NEED FOR HEPATITIS C SCREENING TEST: ICD-10-CM

## 2024-06-12 DIAGNOSIS — B00.9 HERPES SIMPLEX VIRUS INFECTION: ICD-10-CM

## 2024-06-12 PROCEDURE — 99203 OFFICE O/P NEW LOW 30 MIN: CPT | Performed by: FAMILY MEDICINE

## 2024-06-12 RX ORDER — RESPIRATORY SYNCYTIAL VIRUS VACCINE 120MCG/0.5
0.5 KIT INTRAMUSCULAR ONCE
Qty: 1 EACH | Refills: 0 | Status: CANCELLED | OUTPATIENT
Start: 2024-06-12 | End: 2024-06-12

## 2024-06-12 RX ORDER — VALACYCLOVIR HYDROCHLORIDE 500 MG/1
250 TABLET, FILM COATED ORAL DAILY
COMMUNITY
Start: 2024-02-15

## 2024-06-12 NOTE — PROGRESS NOTES
Assessment & Plan     Elevated blood pressure reading without diagnosis of hypertension  She is worried that her blood pressures may have been higher.  He does not have a family history which is somewhat protective.  Blood pressure okay here.  She is instituted good lifestyle changes, so we will continue to monitor at this point.  Has not had BMP that I can tell in a very long time, so could consider that in the future.    Herpes simplex virus infection  New diagnosis for her in the last year.  Manages with 250 of valacyclovir daily.  Has not had recurrences on that dose.  Prior to that was having weeks of since moving    Prevention:  Will come see us in the future for her preventive health.  Reviewed her care gaps today, and most of those are up-to-date.  Did not have time to go over vaccines.  Did review risk score for ASCVD CVD which puts her as eligible for a statin.  She was not interested in that today.  Talked about doing CT calcium scoring to help determine whether we need to do anything about this.  She wants to think about that but certainly some interest in doing so.          I spent a total of 34 minutes on the day of the visit.   Time spent by me doing chart review, history and exam, documentation and further activities per the note          No follow-ups on file.    Subjective   Lou is a 68 year old, presenting for the following health issues:  Hypertension      6/12/2024     1:56 PM   Additional Questions   Roomed by Doua   Accompanied by Self         6/12/2024     1:56 PM   Patient Reported Additional Medications   Patient reports taking the following new medications n/a         6/12/2024    Information    services provided? No     HPI     Has no primary and wondering if I can her PCP again.     HTN concern:  Her home BPs have been a bit higher.   Working with modifying salt in her diet, not aware of any FHx.   Switching to less caffeine.   Using lemon or lime to help  too  Thinks she is getting 5 - 7 servings of vegetables.     New partner a year ago and got herpes. Wondering why gyn says she doesn't need more paps.       Also had shingles on her stomach about a year ago.  Is s/p shingles vaccine.     Wasp allergy    Colonoscopy at [facility] less than 10 years ago. 9/2017. They reported it was normal.     Tobacco - only 6 months so not eligible for lung cancer screening.     Mammogram - goes to [facility].     12/2023:   Component  Ref Range & Units 6 mo ago Comments   CHOLESTEROL,TOTAL  100 - 199 mg/dL 245 High  Cholesterol, Total Reference Ranges    Desirable <200 mg/dL  Borderline 200-239 mg/dL  High >=240 mg/dL   TRIGLYCERIDES  <150 mg/dL 146    HDL CHOLESTEROL  >40 mg/dL 78    NON-HDL CHOLESTEROL  <145 mg/dl 167 High     CHOL/HDL RATIO  <4.50 3.14    LDL CHOLESTEROL  <=130 mg/dL 138 High     VLDL CHOLESTEROL  <=30 mg/dL 29      Statin - 9.7%. ASCVD    Dexa - osteopenia in the past. Is taking two tums a day. Last Dexa was nl.                       Objective    /59 (BP Location: Left arm, Patient Position: Sitting, Cuff Size: Adult Large)   Pulse 88   Temp 97.8  F (36.6  C) (Oral)   Resp 16   Wt 74.8 kg (164 lb 12.8 oz)   SpO2 99%   BMI 25.81 kg/m    Body mass index is 25.81 kg/m .  Physical Exam   GENERAL: alert and no distress  NECK: no adenopathy, no asymmetry, masses, or scars  RESP: lungs clear to auscultation - no rales, rhonchi or wheezes  CV: regular rate and rhythm, normal S1 S2, no S3 or S4, no murmur, click or rub  ABDOMEN: soft, nontender, no hepatosplenomegaly, no masses and bowel sounds normal  MS: no gross musculoskeletal defects noted, Trace bilateral edema            Signed Electronically by: Danelle Mesa MD

## 2024-06-13 NOTE — PATIENT INSTRUCTIONS
Patient Education   Here is the plan from today's visit    1. Elevated blood pressure reading without diagnosis of hypertension  Your blood pressure was totally okay in clinic today.  Your blood pressure cuff at home may not be that accurate.  We will keep an eye on it.  Your increase in vegetables, decrease in salt, and some of the other work that you are doing is great.  Keep working on exercises.    2.  Cholesterol  We talked about how your cholesterol tested earlier in the year with you at a 9.7% risk for heart disease in the next 10 years.  Usually at this point we would start someone on a medication called a statin.  You were less interested in that.  We also talked about doing a CT scan of the arteries in your heart to make sure that they look good.  If they have a lot of calcium in them, then that suggests you would really benefit from a statin.  Get back to me when you would like to think about doing that.          Please call or return to clinic if your symptoms don't go away.    Follow up plan  No follow-ups on file.    Thank you for coming to [facility] today.  Lab Testing:  **If you had lab testing today and your results are reassuring or normal they will be mailed to you or sent through [employer] within 7 days.   **If the lab tests need quick action we will call you with the results.  **If you are having labs done on a different day, please call [phone] to schedule at [facility] or [phone] for other [facility] Outpatient Lab locations. Labs do not offer walk-in appointments.  The phone number we will call with results is # [phone] (home) . If this is not the best number please call our clinic and change the number.  Medication Refills:  If you need any refills please call your pharmacy and they will contact us.   If you need to  your refill at a new pharmacy, please contact the new pharmacy directly. The new pharmacy will help you get your medications transferred  faster.   Scheduling:  If you have any concerns about today's visit or wish to schedule another appointment please call our office during normal business hours [phone] (8-5:00 M-F). If you can no longer make a scheduled visit, please cancel via [employer] or call us to cancel.   If a referral was made to an [facility] specialty provider and you do not get a call from central scheduling, please refer to directions on your visit summary or call our office during normal business hours for assistance.   If a Mammogram was ordered for you at the [facility] Center call [phone] to schedule or change your appointment.  If you had an XRay/CT/Ultrasound/MRI ordered the number is [phone] to schedule or change your radiology appointment.   [facility] has limited ultrasound appointments available on Wednesdays, if you would like your ultrasound at [facility], please call [phone] to schedule.   Medical Concerns:  If you have urgent medical concerns please call [phone] at any time of the day.    Danelle Mesa MD

## 2024-07-16 ENCOUNTER — OFFICE VISIT (OUTPATIENT)
Dept: DERMATOLOGY | Facility: CLINIC | Age: 69
End: 2024-07-16
Payer: COMMERCIAL

## 2024-07-16 ENCOUNTER — TELEPHONE (OUTPATIENT)
Dept: DERMATOLOGY | Facility: CLINIC | Age: 69
End: 2024-07-16

## 2024-07-16 VITALS — HEART RATE: 83 BPM | SYSTOLIC BLOOD PRESSURE: 153 MMHG | DIASTOLIC BLOOD PRESSURE: 86 MMHG

## 2024-07-16 DIAGNOSIS — C44.712 BASAL CELL CARCINOMA (BCC) OF RIGHT LOWER LEG: ICD-10-CM

## 2024-07-16 PROCEDURE — 12042 INTMD RPR N-HF/GENIT2.6-7.5: CPT | Mod: GC | Performed by: DERMATOLOGY

## 2024-07-16 PROCEDURE — 17313 MOHS 1 STAGE T/A/L: CPT | Mod: GC | Performed by: DERMATOLOGY

## 2024-07-16 ASSESSMENT — PAIN SCALES - GENERAL: PAINLEVEL: NO PAIN (0)

## 2024-07-16 NOTE — NURSING NOTE
Chief Complaint   Patient presents with    Procedure     Mohs and reconstruction to right lower leg     Mary Ann PASTRANA CMA

## 2024-07-16 NOTE — PROGRESS NOTES
[facility] [place] Procedure Note    Dermatology Problem List:     1. Malignant melanoma, superficial spreading, right dorsal forearm  - Stage IA, Breslow's 0.42m, no mites, nonulcerated  - S/p WLE 12/8/2015  2. NMSC  - BCC, R lower medial leg, superficial, s/p bx 5/13/24, s/p Mohs 7/16/24  - BCC, L lateral lower leg s/p Mohs and linear repair 11/15/22  - BCC, right post auricular s/p Mohs 12/5/2017  3.  Actinic keratosis  - Cryotherapy today   4. Mildly dysplastic junctional nevus, lower back  - S/p excision 11/27/2015  5. Psoriasiform dermatitis, right earlobe  - S/p biopsy 1/2016  - No current flare or tx  ___________________________________________    Date of Service:  Jul 16, 2024  Surgery: Mohs micrographic surgery    Case 1  Repair Type: intermediate  Repair Size: 4.5 cm  Suture Material: 4-0 monocryl, prolene 4-0  Tumor Type: BCC - Basal cell carcinoma  Location: R lower medial leg  Derm-Path Accession #: [id number]  PreOp Size: 1.3x1.1 cm  PostOp Size: 1.9x1.7 cm  Mohs Accession #:   Level of Defect: fat      Procedure:  We discussed the principles of treatment and most likely complications including scarring, bleeding, infection, swelling, pain, crusting, nerve damage, large wound,  incomplete excision, wound dehiscence,  nerve damage, recurrence, and a second procedure may be recommended to obtain the best cosmetic or functional result.    Informed consent was obtained and the patient underwent the procedure as follows:  The patient was placed supine on the operating table.  The cancer was identified, outlined with a marker, and verified by the patient.  The entire surgical field was prepped with ChoraPrep.  The surgical site was anesthetized using Lidocaine 1% with epi 1:100,000.      The area of clinically apparent tumor was debulked. The layer of tissue was then surgically excised using a #15 blade and was then transferred onto a specimen sheet maintaining  the orientation of the specimen. Hemostasis was obtained using Other (comments) (hyfrecation). The wound site was then covered with a dressing while the tissue samples were processed for examination.    [facility] maintaining the tissue orientation.  The tissue specimen was relaxed so that the entire surgical margin was in a a single horizontal plane for sectioning and inked for precise mapping.  A precise reference map was drawn to reflect the sectioning of the specimen, colored inking of the margins, and orientation on the patient. The tissue was processed using horizontal sectioning of the base and continuous peripheral margins.  The histopathologic sections were reviewed in conjunction with the reference map.    Total blocks: 1    Total slides:  2    There were no cancer cells visualized on examination, therefore Mohs surgery was complete.    Reconstruction: Intermediate Linear Closure      The patient was taken to the operative suite and placed supine on the operating room table. The defect was identified.  Appropriate markings were made with a marking pen to plan the repair. The area was infiltrated with Lidocaine 1% with epi 1:100,000 and prepped with ChoraPrep and draped with sterile towels.     The wound was debeveled and undermined widely. Cones were excised within relaxed skin tension lines on both sides of the defect. Hemostasis was obtained using Other (comments) (hyfrecation). Deep subcutaneous tissues were then approximated using monocryl 4-0 buried vertical mattress sutures. The wound edges were then approximated additional  buried sutures were placed in a similar fashion where needed. prolene 4-0 simple running sutures were carefully placed for maximum eversion and meticulous approximation.    Repair Size: 4.5 cm    The wound was cleansed with saline and ointment was applied along the wound surface.     A sterile pressure dressing was applied.   Wound care instructions were given verbally and in writing.  The patient left the operating suite in stable condition.  Patient was informed that additional refinement of the resulting surgical scar may be used as a second stage of this reconstruction.     The attending surgeon was present for entire minor procedure and examination.      Scribe Disclosure:   I, Jessica Chung, am serving as a scribe; to document services personally performed by Dr. Moshe Yañez - -based on data collection and the provider's statements to me.     Staff Physician Comments:   I saw and evaluated the patient with the dermatology resident (Dr. Lina Hillman) and I agree with the assessment and plan and the above description of the procedure as documented by the scribe. I was present for the entire procedure and entire exam.    Moshe Yañez DO    Department of Dermatology  [facility]: Phone: [phone], Fax:[phone]  [facility]: Phone: [phone], Fax: [phone]    Care and Laboratory Testing Performed at:  [facility]   Clinics and Surgery Center - Dermatologic Surgery Clinic  [address] [place] [address]   Mail Code [id number] [place] [address]

## 2024-07-16 NOTE — LETTER
7/16/2024       RE: Lou Chaudhary  [address] [facility] [address]     Dear Colleague,    Thank you for referring your patient, Lou Chaudhary, to the [facility] DERMATOLOGIC SURGERY CLINIC [place] at [facility]. Please see a copy of my visit note below.      [facility] Dermatologic Surgery Clinic [place] Procedure Note    Dermatology Problem List:     1. Malignant melanoma, superficial spreading, right dorsal forearm  - Stage IA, Breslow's 0.42m, no mites, nonulcerated  - S/p WLE 12/8/2015  2. NMSC  - BCC, R lower medial leg, superficial, s/p bx 5/13/24, s/p Mohs 7/16/24  - BCC, L lateral lower leg s/p Mohs and linear repair 11/15/22  - BCC, right post auricular s/p Mohs 12/5/2017  3.  Actinic keratosis  - Cryotherapy today   4. Mildly dysplastic junctional nevus, lower back  - S/p excision 11/27/2015  5. Psoriasiform dermatitis, right earlobe  - S/p biopsy 1/2016  - No current flare or tx  ___________________________________________    Date of Service:  Jul 16, 2024  Surgery: Mohs micrographic surgery    Case 1  Repair Type: intermediate  Repair Size: 4.5 cm  Suture Material: 4-0 monocryl, prolene 4-0  Tumor Type: BCC - Basal cell carcinoma  Location: R lower medial leg  Derm-Path Accession #: [id number]  PreOp Size: 1.3x1.1 cm  PostOp Size: 1.9x1.7 cm  Mohs Accession #:   Level of Defect: fat      Procedure:  We discussed the principles of treatment and most likely complications including scarring, bleeding, infection, swelling, pain, crusting, nerve damage, large wound,  incomplete excision, wound dehiscence,  nerve damage, recurrence, and a second procedure may be recommended to obtain the best cosmetic or functional result.    Informed consent was obtained and the patient underwent the procedure as follows:  The patient was placed supine on the operating table.  The cancer was identified, outlined with a marker, and verified by the  patient.  The entire surgical field was prepped with ChoraPrep.  The surgical site was anesthetized using Lidocaine 1% with epi 1:100,000.      The area of clinically apparent tumor was debulked. The layer of tissue was then surgically excised using a #15 blade and was then transferred onto a specimen sheet maintaining the orientation of the specimen. Hemostasis was obtained using Other (comments) (hyfrecation). The wound site was then covered with a dressing while the tissue samples were processed for examination.    The excised tissue was transported to the Mohs histology laboratory maintaining the tissue orientation.  The tissue specimen was relaxed so that the entire surgical margin was in a a single horizontal plane for sectioning and inked for precise mapping.  A precise reference map was drawn to reflect the sectioning of the specimen, colored inking of the margins, and orientation on the patient. The tissue was processed using horizontal sectioning of the base and continuous peripheral margins.  The histopathologic sections were reviewed in conjunction with the reference map.    Total blocks: 1    Total slides:  2    There were no cancer cells visualized on examination, therefore Mohs surgery was complete.    Reconstruction: Intermediate Linear Closure      The patient was taken to the operative suite and placed supine on the operating room table. The defect was identified.  Appropriate markings were made with a marking pen to plan the repair. The area was infiltrated with Lidocaine 1% with epi 1:100,000 and prepped with ChoraPrep and draped with sterile towels.     The wound was debeveled and undermined widely. Cones were excised within relaxed skin tension lines on both sides of the defect. Hemostasis was obtained using Other (comments) (hyfrecation). Deep subcutaneous tissues were then approximated using monocryl 4-0 buried vertical mattress sutures. The wound edges were then approximated additional   buried sutures were placed in a similar fashion where needed. prolene 4-0 simple running sutures were carefully placed for maximum eversion and meticulous approximation.    Repair Size: 4.5 cm    The wound was cleansed with saline and ointment was applied along the wound surface.     A sterile pressure dressing was applied.  Wound care instructions were given verbally and in writing.  The patient left the operating suite in stable condition.  Patient was informed that additional refinement of the resulting surgical scar may be used as a second stage of this reconstruction.     The attending surgeon was present for entire minor procedure and examination.      Scribe Disclosure:   I, Jessica Chung, am serving as a scribe; to document services personally performed by Dr. Moshe Yañez - -based on data collection and the provider's statements to me.     Staff Physician Comments:   I saw and evaluated the patient with the dermatology resident (Dr. Lina Hillman) and I agree with the assessment and plan and the above description of the procedure as documented by the scribe. I was present for the entire procedure and entire exam.    Moshe Yañez DO    Department of Dermatology  [facility]: Phone: [phone], Fax:[phone]  [facility]: Phone: [phone], Fax: [phone]    Care and Laboratory Testing Performed at:  [facility]   Clinics and Surgery Center - Dermatologic Surgery Clinic  [address] [place] [address]   Mail Code [id number]   [place] [address]      Again, thank you for allowing me to participate in the care of your patient.      Sincerely,    Moshe Yañez MD

## 2024-07-16 NOTE — TELEPHONE ENCOUNTER
Follow up call completed following Mohs procedure with Dr. Yañez.       Are you having pain? no  Are you taking pain medication? Tylenol   Are you applying ice?  no  Have you had any noticeable bleeding through the bandage? no   Do you have any other concerns? no       Please call [phone] if you have any questions or concerns.

## 2024-07-29 ENCOUNTER — OFFICE VISIT (OUTPATIENT)
Dept: DERMATOLOGY | Facility: CLINIC | Age: 69
End: 2024-07-29
Payer: COMMERCIAL

## 2024-07-29 DIAGNOSIS — Z85.828 HISTORY OF NONMELANOMA SKIN CANCER: ICD-10-CM

## 2024-07-29 DIAGNOSIS — Z48.89 ENCOUNTER FOR POSTOPERATIVE WOUND CHECK: Primary | ICD-10-CM

## 2024-07-29 PROCEDURE — 99000 SPECIMEN HANDLING OFFICE-LAB: CPT | Performed by: PATHOLOGY

## 2024-07-29 PROCEDURE — 87070 CULTURE OTHR SPECIMN AEROBIC: CPT | Performed by: DERMATOLOGY

## 2024-07-29 PROCEDURE — 99024 POSTOP FOLLOW-UP VISIT: CPT | Performed by: DERMATOLOGY

## 2024-07-29 RX ORDER — MUPIROCIN 20 MG/G
OINTMENT TOPICAL
Qty: 30 G | Refills: 0 | Status: SHIPPED | OUTPATIENT
Start: 2024-07-29

## 2024-07-29 ASSESSMENT — PAIN SCALES - GENERAL: PAINLEVEL: NO PAIN (0)

## 2024-07-29 NOTE — NURSING NOTE
Chief Complaint   Patient presents with    Derm Problem     Patient report possible infection of their procedure site. The patient reports swelling and redness at the site starting yesterday.      Kirstin Calvo LPN     Multifactorial frequency.  Probably his diabetic control will affect this more than BPH.  He is again counseled on stricter dietary control.

## 2024-07-29 NOTE — LETTER
7/29/2024       RE: Lou Chaudhary  [address] [facility] [address]     Dear Colleague,    Thank you for referring your patient, Lou Chaudhary, to the [facility] DERMATOLOGIC SURGERY CLINIC [place] at [facility]. Please see a copy of my visit note below.    Dermatologic Surgery Post-Op Wound Check     CC: No chief complaint on file.      Dermatology Problem List:  1. Malignant melanoma, superficial spreading, right dorsal forearm  - Stage IA, Breslow's 0.42m, no mites, nonulcerated  - S/p WLE 12/8/2015  2. NMSC  - BCC, R lower medial leg, superficial, s/p bx 5/13/24, s/p Mohs 7/16/24  - BCC, L lateral lower leg s/p Mohs and linear repair 11/15/22  - BCC, right post auricular s/p Mohs 12/5/2017  3.  Actinic keratosis  - Cryotherapy today   4. Mildly dysplastic junctional nevus, lower back  - S/p excision 11/27/2015  5. Psoriasiform dermatitis, right earlobe  - S/p biopsy 1/2016  - No current flare or tx  __________________    Subjective: Lou Chaudhary is a 68 year old female who presents today for wound check after 7/16/24 MMS with intermediate closure of BCC on right lower medial leg.   - noted some new swelling which was worse last night  - no other concerns today    Objective: An exam of the right lower leg was performed today   - The surgical site noted above is clean, dry, and intact. There is no surrounding erythema, purulence, or significant tenderness to palpation. No clinical evidence of infection noted today.redness less than 1 cm around wound edge.    Assessment and Plan:     1. BCC, R lower medial leg, superficial, s/p bx 5/13/24, s/p Mohs 7/16/24  - The patient's surgery site(s) is/are healing very well. No evidence of infection on examination today.  - culture today to r/o infection  - empiric mupirocin while awaiting culture results  - counseled patient to call if sx worsen.  - The patient was told to continue with wound cares until the  area(s) is/are no longer crusted.   - The patient should follow up with dermatologic surgery PRN, as well as continue with regular skin exams in general dermatology clinic.    Patient was discussed with and evaluated by attending physician Dr.Ian Diamond MD.    Staff Involved:   Scribe/Fellow/Staff     Scribe Disclosure:   I, SUREKHA BAKER, am serving as a scribe; to document services personally performed by Piyush Fields MD -based on data collection and the provider's statements to me.      Attending Attestation  I attest that the Scribe recorded the interview and exam that I personally performed.  I have reviewed the note and edited it as necessary.    Piyush Fields M.D.  Professor  Director of Dermatologic Surgery  Department of Dermatology  [facility]                Again, thank you for allowing me to participate in the care of your patient.      Sincerely,    Piyush Fields MD

## 2024-07-29 NOTE — PROGRESS NOTES
Dermatologic Surgery Post-Op Wound Check     CC: No chief complaint on file.      Dermatology Problem List:  1. Malignant melanoma, superficial spreading, right dorsal forearm  - Stage IA, Breslow's 0.42m, no mites, nonulcerated  - S/p WLE 12/8/2015  2. NMSC  - BCC, R lower medial leg, superficial, s/p bx 5/13/24, s/p Mohs 7/16/24  - BCC, L lateral lower leg s/p Mohs and linear repair 11/15/22  - BCC, right post auricular s/p Mohs 12/5/2017  3.  Actinic keratosis  - Cryotherapy today   4. Mildly dysplastic junctional nevus, lower back  - S/p excision 11/27/2015  5. Psoriasiform dermatitis, right earlobe  - S/p biopsy 1/2016  - No current flare or tx  __________________    Subjective: Lou Chaudhary is a 68 year old female who presents today for wound check after 7/16/24 MMS with intermediate closure of BCC on right lower medial leg.   - noted some new swelling which was worse last night  - no other concerns today    Objective: An exam of the right lower leg was performed today   - The surgical site noted above is clean, dry, and intact. There is no surrounding erythema, purulence, or significant tenderness to palpation. No clinical evidence of infection noted today.redness less than 1 cm around wound edge.    Assessment and Plan:     1. BCC, R lower medial leg, superficial, s/p bx 5/13/24, s/p Mohs 7/16/24  - The patient's surgery site(s) is/are healing very well. No evidence of infection on examination today.  - culture today to r/o infection  - empiric mupirocin while awaiting culture results  - counseled patient to call if sx worsen.  - The patient was told to continue with wound cares until the area(s) is/are no longer crusted.   - The patient should follow up with dermatologic surgery PRN, as well as continue with regular skin exams in general dermatology clinic.    Patient was discussed with and evaluated by attending physician Dr.Ian Diamond MD.    Staff Involved:   Scribe/Fellow/Staff     Scribe Disclosure:    I, SUREKHA BAKER, am serving as a scribe; to document services personally performed by Piyush Fields MD -based on data collection and the provider's statements to me.      Attending Attestation  I attest that the Scribe recorded the interview and exam that I personally performed.  I have reviewed the note and edited it as necessary.    Piyush Fields M.D.  Professor  Director of Dermatologic Surgery  Department of Dermatology  [facility]

## 2024-07-31 LAB
BACTERIA SPEC CULT: NORMAL
GRAM STAIN RESULT: NORMAL
GRAM STAIN RESULT: NORMAL

## 2024-11-18 ENCOUNTER — OFFICE VISIT (OUTPATIENT)
Dept: DERMATOLOGY | Facility: CLINIC | Age: 69
End: 2024-11-18
Payer: COMMERCIAL

## 2024-11-18 DIAGNOSIS — T63.461A ALLERGIC REACTION TO WASP STING: ICD-10-CM

## 2024-11-18 DIAGNOSIS — Z85.828 HISTORY OF NONMELANOMA SKIN CANCER: Primary | ICD-10-CM

## 2024-11-18 DIAGNOSIS — Z85.820 HISTORY OF MELANOMA: ICD-10-CM

## 2024-11-18 DIAGNOSIS — D22.9 MULTIPLE BENIGN NEVI: ICD-10-CM

## 2024-11-18 DIAGNOSIS — L82.1 SEBORRHEIC KERATOSIS: ICD-10-CM

## 2024-11-18 PROCEDURE — 99213 OFFICE O/P EST LOW 20 MIN: CPT | Mod: GC | Performed by: DERMATOLOGY

## 2024-11-18 RX ORDER — EPINEPHRINE 0.3 MG/.3ML
INJECTION SUBCUTANEOUS
Qty: 2 EACH | Refills: 0 | Status: SHIPPED | OUTPATIENT
Start: 2024-11-18

## 2024-11-18 ASSESSMENT — PAIN SCALES - GENERAL: PAINLEVEL_OUTOF10: NO PAIN (0)

## 2024-11-18 NOTE — PROGRESS NOTES
[facility] Dermatology Note  Encounter Date: Nov 18, 2024  Office Visit     Dermatology Problem List:  1. Malignant melanoma, superficial spreading, right dorsal forearm  - Stage IA, Breslow's 0.42m, no mites, nonulcerated  - S/p WLE 12/8/2015  2. NMSC  - BCC, R lower medial leg, superficial, s/p bx 5/13/24, s/p Mohs 7/16/24  - BCC, L lateral lower leg s/p Mohs and linear repair 11/15/22  - BCC, right post auricular s/p Mohs 12/5/2017  3.  Actinic keratosis  - Cryotherapy today   4. Mildly dysplastic junctional nevus, lower back  - S/p excision 11/27/2015  5. Psoriasiform dermatitis, right earlobe  - S/p biopsy 1/2016  - No current flare or tx    ____________________________________________    Assessment & Plan:     # History of NMSC and MM  - NERD  - SPF30+ and sun protective clothing advised    # Benign skin lesions: solar lentigines, seborrheic keratoses, cherry angiomas, clinically banal nevi  - Discussed benign nature of these lesions  - No treatment necessary    Procedures Performed:   None    Follow-up: August 2025 as scheduled    Staff and Resident:  IMarkos MD, discussed and evaluated the patient with Dr. Fierro.  IMelinda MD, saw this patient with the resident and agree with the resident s findings and plan of care as documented in the resident s note.    ____________________________________________    CC: Skin Check    HPI:  Ms. Lou Chaudhary is a(n) 69 year old female who presents today as a return patient for FBSE. Last seen in derm clinic 5/13/24 for skin check and had a biopsy on her right lower leg that returned as a sBCC. She had MMS 7/16/24. Today reports some spots on the lower legs that she would like checked. Otherwise feeling well, without additional skin concerns.    Labs Reviewed:  N/A    Physical Exam:  Vitals: There were no vitals taken for this visit.  SKIN: Full skin, which includes the head/face, both arms, chest, back, abdomen,both legs,  genitalia and/or groin buttocks, digits and/or nails, was examined.  - Tan-brown evenly pigmented macules on sun exposed areas  - Bright red dome shaped papules on trunk  - Tan and brown waxy stuck on papules on trunk  - Well healed scars in areas of prior skin cancer without nodularity  - No cervical, clavicular or axillary lymphadenopathy  - No other lesions of concern on areas examined.     Medications:  Current Outpatient Medications   Medication Sig Dispense Refill    acetaminophen (TYLENOL) 325 MG tablet Take 325-650 mg by mouth as needed      EPINEPHrine (ANY BX GENERIC EQUIV) 0.3 MG/0.3ML injection 2-pack Inject one pen into the muscle 2 each 0    valACYclovir (VALTREX) 500 MG tablet Take 250 mg by mouth daily      mupirocin (BACTROBAN) 2 % external ointment Use 2 times a day to affected area. (Patient not taking: Reported on 11/18/2024) 30 g 0     No current facility-administered medications for this visit.        Past Medical History:   Patient Active Problem List   Diagnosis    History of melanoma    Idiopathic scoliosis    Hematochezia    Benign neoplasm of rectum    History of dysplastic nevus    Xerosis cutis    Multiple benign nevi    Actinic keratosis    History of melanoma in situ    Herpes simplex virus infection     Past Medical History:   Diagnosis Date    Malignant melanoma (H)     Scoliosis        CC Melinda Fierro MD  [address] [facility] [address]  [place] [address] on close of this encounter.

## 2024-11-18 NOTE — LETTER
11/18/2024       RE: Lou Chaudhary  [address] [facility] [address]     Dear Colleague,    Thank you for referring your patient, Lou Chaudhary, to the [facility] DERMATOLOGY CLINIC [place] at [facility]. Please see a copy of my visit note below.    [facility] Dermatology Note  Encounter Date: Nov 18, 2024  Office Visit     Dermatology Problem List:  1. Malignant melanoma, superficial spreading, right dorsal forearm  - Stage IA, Breslow's 0.42m, no mites, nonulcerated  - S/p WLE 12/8/2015  2. NMSC  - BCC, R lower medial leg, superficial, s/p bx 5/13/24, s/p Mohs 7/16/24  - BCC, L lateral lower leg s/p Mohs and linear repair 11/15/22  - BCC, right post auricular s/p Mohs 12/5/2017  3.  Actinic keratosis  - Cryotherapy today   4. Mildly dysplastic junctional nevus, lower back  - S/p excision 11/27/2015  5. Psoriasiform dermatitis, right earlobe  - S/p biopsy 1/2016  - No current flare or tx    ____________________________________________    Assessment & Plan:     # History of NMSC and MM  - NERD  - SPF30+ and sun protective clothing advised    # Benign skin lesions: solar lentigines, seborrheic keratoses, cherry angiomas, clinically banal nevi  - Discussed benign nature of these lesions  - No treatment necessary    Procedures Performed:   None    Follow-up: August 2025 as scheduled    Staff and Resident:  IMarkos MD, discussed and evaluated the patient with Dr. Fierro.  Melinda DANIEL MD, saw this patient with the resident and agree with the resident s findings and plan of care as documented in the resident s note.    ____________________________________________    CC: Skin Check    HPI:  Ms. Lou Chaudhary is a(n) 69 year old female who presents today as a return patient for FBSE. Last seen in derm clinic 5/13/24 for skin check and had a biopsy on her right lower leg that returned as a sBCC. She had MMS 7/16/24. Today  reports some spots on the lower legs that she would like checked. Otherwise feeling well, without additional skin concerns.    Labs Reviewed:  N/A    Physical Exam:  Vitals: There were no vitals taken for this visit.  SKIN: Full skin, which includes the head/face, both arms, chest, back, abdomen,both legs, genitalia and/or groin buttocks, digits and/or nails, was examined.  - Tan-brown evenly pigmented macules on sun exposed areas  - Bright red dome shaped papules on trunk  - Tan and brown waxy stuck on papules on trunk  - Well healed scars in areas of prior skin cancer without nodularity  - No cervical, clavicular or axillary lymphadenopathy  - No other lesions of concern on areas examined.     Medications:  Current Outpatient Medications   Medication Sig Dispense Refill     acetaminophen (TYLENOL) 325 MG tablet Take 325-650 mg by mouth as needed       EPINEPHrine (ANY BX GENERIC EQUIV) 0.3 MG/0.3ML injection 2-pack Inject one pen into the muscle 2 each 0     valACYclovir (VALTREX) 500 MG tablet Take 250 mg by mouth daily       mupirocin (BACTROBAN) 2 % external ointment Use 2 times a day to affected area. (Patient not taking: Reported on 11/18/2024) 30 g 0     No current facility-administered medications for this visit.        Past Medical History:   Patient Active Problem List   Diagnosis     History of melanoma     Idiopathic scoliosis     Hematochezia     Benign neoplasm of rectum     History of dysplastic nevus     Xerosis cutis     Multiple benign nevi     Actinic keratosis     History of melanoma in situ     Herpes simplex virus infection     Past Medical History:   Diagnosis Date     Malignant melanoma (H)      Scoliosis        CC Melinda Fierro MD  [address] [facility] [address]  [place] [address] on close of this encounter.      Again, thank you for allowing me to participate in the care of your patient.      Sincerely,    Melinda Fierro MD

## 2024-11-18 NOTE — NURSING NOTE
Dermatology Rooming Note    Lou Chaudhary's goals for this visit include:   Chief Complaint   Patient presents with    Skin Check     Darren Mena, EMT  Clinic Support  [facility]     [phone]    Employed by [employer]

## 2024-12-02 ENCOUNTER — TRANSFERRED RECORDS (OUTPATIENT)
Dept: HEALTH INFORMATION MANAGEMENT | Facility: CLINIC | Age: 69
End: 2024-12-02
Payer: COMMERCIAL

## 2024-12-05 DIAGNOSIS — R03.0 ELEVATED BLOOD PRESSURE READING WITHOUT DIAGNOSIS OF HYPERTENSION: ICD-10-CM

## 2024-12-05 DIAGNOSIS — R19.7 DIARRHEA, UNSPECIFIED TYPE: Primary | ICD-10-CM

## 2024-12-11 ENCOUNTER — TRANSFERRED RECORDS (OUTPATIENT)
Dept: HEALTH INFORMATION MANAGEMENT | Facility: CLINIC | Age: 69
End: 2024-12-11
Payer: COMMERCIAL

## 2024-12-12 ENCOUNTER — TRANSFERRED RECORDS (OUTPATIENT)
Dept: HEALTH INFORMATION MANAGEMENT | Facility: CLINIC | Age: 69
End: 2024-12-12
Payer: COMMERCIAL

## 2024-12-16 ENCOUNTER — TRANSFERRED RECORDS (OUTPATIENT)
Dept: HEALTH INFORMATION MANAGEMENT | Facility: CLINIC | Age: 69
End: 2024-12-16
Payer: COMMERCIAL

## 2024-12-18 ENCOUNTER — OFFICE VISIT (OUTPATIENT)
Dept: FAMILY MEDICINE | Facility: CLINIC | Age: 69
End: 2024-12-18
Payer: COMMERCIAL

## 2024-12-18 VITALS
HEART RATE: 103 BPM | SYSTOLIC BLOOD PRESSURE: 153 MMHG | RESPIRATION RATE: 16 BRPM | TEMPERATURE: 98.7 F | OXYGEN SATURATION: 99 % | DIASTOLIC BLOOD PRESSURE: 85 MMHG

## 2024-12-18 DIAGNOSIS — T63.461A ALLERGIC REACTION TO WASP STING: ICD-10-CM

## 2024-12-18 DIAGNOSIS — B00.9 HERPES SIMPLEX VIRUS INFECTION: ICD-10-CM

## 2024-12-18 DIAGNOSIS — R03.0 ELEVATED BLOOD PRESSURE READING WITHOUT DIAGNOSIS OF HYPERTENSION: Primary | ICD-10-CM

## 2024-12-18 RX ORDER — EPINEPHRINE 0.3 MG/.3ML
INJECTION SUBCUTANEOUS
Qty: 2 EACH | Refills: 0 | Status: SHIPPED | OUTPATIENT
Start: 2024-12-18

## 2024-12-18 RX ORDER — VALACYCLOVIR HYDROCHLORIDE 500 MG/1
250 TABLET, FILM COATED ORAL DAILY
Qty: 45 TABLET | Refills: 3 | Status: SHIPPED | OUTPATIENT
Start: 2024-12-18

## 2024-12-18 ASSESSMENT — PAIN SCALES - GENERAL: PAINLEVEL_OUTOF10: MILD PAIN (2)

## 2024-12-18 NOTE — PROGRESS NOTES
Assessment & Plan     Elevated blood pressure reading without diagnosis of hypertension  Reviewed her past BPs, some of which have been higher.  Also sounds like her home BPs are a bit higher at times. Plan to do home checks and also 24 hour ambulatory monitor to rule out new HTN. Added BMP to evaluate renal function.  Recent UA at another facility was negative for protein.    - Basic metabolic panel; Future  - Lipid panel reflex to direct LDL Non-fasting; Future  - 24 Hour Blood Pressure Monitor - Adult; Future  - Basic metabolic panel  - Lipid panel reflex to direct LDL Non-fasting    Allergic reaction to wasp sting  Refilled   - EPINEPHrine (ANY BX GENERIC EQUIV) 0.3 MG/0.3ML injection 2-pack; Inject one pen into the muscle    Herpes simplex virus infection  Reviewed that could trial without prophylaxis post a year but is going to travel to [place] so recommend continue for now.  Could trial off once gets back. Noted that topical treatment is not quite as good.   - valACYclovir (VALTREX) 500 MG tablet; Take 0.5 tablets (250 mg) by mouth daily.      I spent a total of 35 minutes on the day of the visit.   Time spent by me today doing chart review, history and exam, documentation and further activities per the note  The longitudinal plan of care for the diagnosis(es)/condition(s) as documented were addressed during this visit. Due to the added complexity in care, I will continue to support Lou in the subsequent management and with ongoing continuity of care.          No follow-ups on file.    Subjective   Lou is a 69 year old, presenting for the following health issues:  Recheck Medication (Medication refills and some questions about medications.)      12/18/2024     3:57 PM   Additional Questions   Roomed by Glenda   Accompanied by self         12/18/2024    Information    services provided? No        HPI       Recent increase in GI symptoms. Pain in both Upper quadrants of her abdomen.  Is getting better. Work up so far by [facility] is negative. Had EGD today which shows a hiatal hernia and likely duodenal polyp. In process for more evaluation of the polyp. Multiple biopsies done and pending.   Started after eating Oysters in August.   Wondering about Hepatitis A    Wondering if she needs to stop Tylenol or ASA or Ibuprofen. Did note more GI symptoms on high dose Ibuprofen.     Blood pressure  Notes having high readings and then they come down.    Not sure if she has family history of HTN.  Mother with high cholesterol.      Also wondering about Valcyclovir and ? cause of HTN -   Was taking half a pill and it kept her from having outbreaks.    ?topical                     Objective    BP (!) 153/85   Pulse 103   Temp 98.7  F (37.1  C) (Oral)   Resp 16   SpO2 99%   There is no height or weight on file to calculate BMI.  Physical Exam   GENERAL: alert and no distress  NECK: no adenopathy, no asymmetry, masses, or scars  RESP: lungs clear to auscultation - no rales, rhonchi or wheezes  CV: regular rate and rhythm, normal S1 S2, no S3 or S4, no murmur, click or rub, no peripheral edema  MS: no gross musculoskeletal defects noted, no edema            Signed Electronically by: Danelle Mesa MD

## 2024-12-19 LAB
ANION GAP SERPL CALCULATED.3IONS-SCNC: 10 MMOL/L (ref 7–15)
BUN SERPL-MCNC: 11.7 MG/DL (ref 8–23)
CALCIUM SERPL-MCNC: 9.1 MG/DL (ref 8.8–10.4)
CHLORIDE SERPL-SCNC: 98 MMOL/L (ref 98–107)
CHOLEST SERPL-MCNC: 232 MG/DL
CREAT SERPL-MCNC: 0.6 MG/DL (ref 0.51–0.95)
EGFRCR SERPLBLD CKD-EPI 2021: >90 ML/MIN/1.73M2
FASTING STATUS PATIENT QL REPORTED: ABNORMAL
FASTING STATUS PATIENT QL REPORTED: ABNORMAL
GLUCOSE SERPL-MCNC: 126 MG/DL (ref 70–99)
HCO3 SERPL-SCNC: 26 MMOL/L (ref 22–29)
HDLC SERPL-MCNC: 68 MG/DL
LDLC SERPL CALC-MCNC: 117 MG/DL
NONHDLC SERPL-MCNC: 164 MG/DL
POTASSIUM SERPL-SCNC: 4.6 MMOL/L (ref 3.4–5.3)
SODIUM SERPL-SCNC: 134 MMOL/L (ref 135–145)
TRIGL SERPL-MCNC: 237 MG/DL

## 2024-12-19 NOTE — PATIENT INSTRUCTIONS
Patient Education   Here is the plan from today's visit      Belly pain - I would take Tylenol over the other over the counter medications for pain.       1. Elevated blood pressure reading without diagnosis of hypertension (Primary)  Plan to get the 24 hour blood pressure monitor.  How to take your home blood pressures to diagnose whether you have high blood pressure:  Take your blood pressure twice (one minute apart) at home 2 times daily, in the morning (between 6 am and 10 am) and in the evening (between 6 pm and 10 pm), and record only the second reading. Do this for 7 days.  Please write down the results and the time you took your blood pressure and bring those with you at your next visit.      If 3 out of 10 of your systolic (the top number) are greater or equal to 135, then you likely have high blood pressure.      - Basic metabolic panel; Future  - Lipid panel reflex to direct LDL Non-fasting; Future  - 24 Hour Blood Pressure Monitor - Adult; Future  - Basic metabolic panel  - Lipid panel reflex to direct LDL Non-fasting    2. Allergic reaction to wasp sting  refilled  - EPINEPHrine (ANY BX GENERIC EQUIV) 0.3 MG/0.3ML injection 2-pack; Inject one pen into the muscle  Dispense: 2 each; Refill: 0    3. Herpes simplex virus infection  I would restart the Valtrex in preparation of your upcoming trip. Once you are back, you could consider stopping to see what happens.  It is not uncommon that after a year of suppression, that you don't have many outbreaks and if you happen to have one, you can treat right away with the pills (then take 1 pill twice a day).  There is a topical medication but it is not as effective.   - valACYclovir (VALTREX) 500 MG tablet; Take 0.5 tablets (250 mg) by mouth daily.  Dispense: 45 tablet; Refill: 3          Please call or return to clinic if your symptoms don't go away.    Follow up plan  No follow-ups on file.    Thank you for coming to [facility] today.  Lab Testing:  **If  you had lab testing today and your results are reassuring or normal they will be mailed to you or sent through [employer] within 7 days.   **If the lab tests need quick action we will call you with the results.  **If you are having labs done on a different day, please call [phone] to schedule at [facility] or [phone] for other [facility] Outpatient Lab locations. Labs do not offer walk-in appointments.  The phone number we will call with results is # [phone] (home) . If this is not the best number please call our clinic and change the number.  Medication Refills:  If you need any refills please call your pharmacy and they will contact us.   If you need to  your refill at a new pharmacy, please contact the new pharmacy directly. The new pharmacy will help you get your medications transferred faster.   Scheduling:  If you have any concerns about today's visit or wish to schedule another appointment please call our office during normal business hours [phone] (8-5:00 M-F). If you can no longer make a scheduled visit, please cancel via [employer] or call us to cancel.   If a referral was made to an [facility] specialty provider and you do not get a call from central scheduling, please refer to directions on your visit summary or call our office during normal business hours for assistance.   If a Mammogram was ordered for you at the [facility] call [phone] to schedule or change your appointment.  If you had an XRay/CT/Ultrasound/MRI ordered the number is [phone] to schedule or change your radiology appointment.   [facility] has limited ultrasound appointments available on Wednesdays, if you would like your ultrasound at [facility], please call [phone] to schedule.   Medical Concerns:  If you have urgent medical concerns please call [phone] at any time of the day.    Danelle Mesa MD

## 2024-12-24 ENCOUNTER — PATIENT OUTREACH (OUTPATIENT)
Dept: ONCOLOGY | Facility: CLINIC | Age: 69
End: 2024-12-24
Payer: COMMERCIAL

## 2024-12-24 ENCOUNTER — TRANSCRIBE ORDERS (OUTPATIENT)
Dept: OTHER | Age: 69
End: 2024-12-24

## 2024-12-24 DIAGNOSIS — K44.9 HIATAL HERNIA: Primary | ICD-10-CM

## 2024-12-24 NOTE — PROGRESS NOTES
New Patient Thoracic Surgery Nurse Navigator Note     Referring provider: WILSON PRINCE affiliated with [facility] ENDOSCOPY [place] [address] [facility] [address].      Referred to (specialty): Thoracic Surgery    Requested provider (if applicable): n/a     Date Referral Received: 12/24/2024     Evaluation for : Hiatal hernia     Clinical History (per Nurse review of records provided):    **BOOK MARKED**      12/18/2024:  UPPER GI ENDOSCOPY [facility]  12/20/2024:  CT Cardiac Calcium Screen    Records Location (Care Everywhere, Media, etc.): [facility], [facility] ([facility]) & [facility]     RECORDS NEEDED:  imaging pushed to PACS from   12/18/2024:  UPPER GI ENDOSCOPY [facility]   12/20/2024:  CT Calcium Screen from [facility]  -thank you!!     Additional testing needed prior to consult: CT Chest

## 2024-12-26 ENCOUNTER — PRE VISIT (OUTPATIENT)
Dept: ONCOLOGY | Facility: CLINIC | Age: 69
End: 2024-12-26
Payer: COMMERCIAL

## 2024-12-26 PROBLEM — D13.2 ADENOMATOUS DUODENAL POLYP: Status: ACTIVE | Noted: 2024-12-26

## 2024-12-26 NOTE — TELEPHONE ENCOUNTER
Imaging Received  December 26, 2024 11:52 AM ABT   Action: Images from [facility] received and resolved to PACS.     RECORDS STATUS - ALL OTHER DIAGNOSIS      RECORDS RECEIVED FROM:    NOTES STATUS DETAILS   OFFICE NOTE from referring provider Ext: [facility] 12/12/24: Dr. Tristin Russell   OFFICE NOTE from medical oncologist     OFFICE NOTE from other specialist     DISCHARGE SUMMARY from hospital     DISCHARGE REPORT from the ER     OPERATIVE REPORT Ext: [facility] 12/11/24: Upper GI   MEDICATION LIST Ext: [facility]    LABS     PATHOLOGY REPORTS Ext: [facility] 12/11/24: [id number]   ANYTHING RELATED TO DIAGNOSIS Ext: [facility] Most recent 12/02/24   PATHOLOGY FEDEX TRACKING   Tracking #:   GENONOMIC TESTING     TYPE:     IMAGING (NEED IMAGES & REPORT)     CT SCANS PACS [facility]:  12/20/24: CT Cardiac   MRI     XRAYS     ULTRASOUND     PET     IMAGE DISC FEDEX TRACKING   Tracking #:

## 2025-01-25 ENCOUNTER — HEALTH MAINTENANCE LETTER (OUTPATIENT)
Age: 70
End: 2025-01-25

## 2025-02-04 ENCOUNTER — TRANSFERRED RECORDS (OUTPATIENT)
Dept: HEALTH INFORMATION MANAGEMENT | Facility: CLINIC | Age: 70
End: 2025-02-04
Payer: COMMERCIAL

## 2025-02-06 ENCOUNTER — TRANSFERRED RECORDS (OUTPATIENT)
Dept: HEALTH INFORMATION MANAGEMENT | Facility: CLINIC | Age: 70
End: 2025-02-06
Payer: COMMERCIAL

## 2025-02-06 ENCOUNTER — TRANSCRIBE ORDERS (OUTPATIENT)
Dept: OTHER | Age: 70
End: 2025-02-06

## 2025-02-06 DIAGNOSIS — K62.1 COLORECTAL POLYPS: Primary | ICD-10-CM

## 2025-02-06 DIAGNOSIS — K63.5 COLORECTAL POLYPS: Primary | ICD-10-CM

## 2025-02-07 ENCOUNTER — TRANSFERRED RECORDS (OUTPATIENT)
Dept: HEALTH INFORMATION MANAGEMENT | Facility: CLINIC | Age: 70
End: 2025-02-07
Payer: COMMERCIAL

## 2025-02-11 PROBLEM — D13.2 ADENOMATOUS DUODENAL POLYP: Status: ACTIVE | Noted: 2024-12-26

## 2025-02-21 PROBLEM — K44.9 HIATAL HERNIA: Status: ACTIVE | Noted: 2025-02-21

## 2025-03-18 ENCOUNTER — OFFICE VISIT (OUTPATIENT)
Dept: FAMILY MEDICINE | Facility: CLINIC | Age: 70
End: 2025-03-18
Payer: COMMERCIAL

## 2025-03-18 VITALS
OXYGEN SATURATION: 98 % | SYSTOLIC BLOOD PRESSURE: 161 MMHG | BODY MASS INDEX: 25.71 KG/M2 | DIASTOLIC BLOOD PRESSURE: 85 MMHG | WEIGHT: 160 LBS | TEMPERATURE: 97.9 F | RESPIRATION RATE: 16 BRPM | HEART RATE: 88 BPM | HEIGHT: 66 IN

## 2025-03-18 DIAGNOSIS — D13.2 ADENOMATOUS DUODENAL POLYP: ICD-10-CM

## 2025-03-18 DIAGNOSIS — Z01.818 PREOP GENERAL PHYSICAL EXAM: Primary | ICD-10-CM

## 2025-03-18 PROBLEM — K31.7 POLYP OF DUODENUM: Status: ACTIVE | Noted: 2024-12-26

## 2025-03-18 ASSESSMENT — PAIN SCALES - GENERAL: PAINLEVEL_OUTOF10: NO PAIN (0)

## 2025-03-18 NOTE — PATIENT INSTRUCTIONS
How to Take Your Medication Before Surgery  Preoperative Medication Instructions   Antiplatelet or Anticoagulation Medication Instructions   - We reviewed the medication list and the patient is not on an antiplatelet or anticoagulation medications.    Additional Medication Instructions  We reviewed the medication list and there are no chronic medications that need to be adjusted for this procedure.       Patient Education   Preparing for Your Surgery  For Adults  Getting started  In most cases, a nurse will call to review your health history and instructions. They will give you an arrival time based on your scheduled surgery time. Please be ready to share:  Your doctor's clinic name and phone number  Your medical, surgical, and anesthesia history  A list of allergies and sensitivities  A list of medicines, including herbal treatments and over-the-counter drugs  Whether the patient has a legal guardian (ask how to send us the papers in advance)  Note: You may not receive a call if you were seen at our PAC (Preoperative Assessment Center).  Please tell us if you're pregnant--or if there's any chance you might be pregnant. Some surgeries may injure a fetus (unborn baby), so they require a pregnancy test. Surgeries that are safe for a fetus don't always need a test, and you can choose whether to have one.   Preparing for surgery  Within 10 to 30 days of surgery: Have a pre-op exam (sometimes called an H&P, or History and Physical). This can be done at a clinic or pre-operative center.  If you're having a , you may not need this exam. Talk to your care team.  At your pre-op exam, talk to your care team about all medicines you take. (This includes CBD oil and any drugs, such as THC, marijuana, and other forms of cannabis.) If you need to stop any medicine before surgery, ask when to start taking it again.  This is for your safety. Many medicines and drugs can make you bleed too much during surgery. Some change  how well surgery (anesthesia) drugs work.  Call your insurance company to let them know you're having surgery. (If you don't have insurance, call [phone].)  Call your clinic if there's any change in your health. This includes a scrape or scratch near the surgery site, or any signs of a cold (sore throat, runny nose, cough, rash, fever).  Eating and drinking guidelines  For your safety: Unless your surgeon tells you otherwise, follow the guidelines below.  Eat and drink as normal until 8 hours before you arrive for surgery. After that, no food or milk. You can spit out gum when you arrive.  Drink clear liquids until 2 hours before you arrive. These are liquids you can see through, like water, Gatorade, and Propel Water. They also include plain black coffee and tea (no cream or milk).  No alcohol for 24 hours before you arrive. The night before surgery, stop any drinks that contain THC.  If your care team tells you to take medicine on the morning of surgery, it's okay to take it with a sip of water. No other medicines or drugs are allowed (including CBD oil)--follow your care team's instructions.  If you have questions the day of surgery, call your hospital or surgery center.   Preventing infection  Shower or bathe the night before and the morning of surgery. Follow the instructions your clinic gave you. (If no instructions, use regular soap.)  Don't shave or clip hair near your surgery site. We'll remove the hair if needed.  Don't smoke or vape the morning of surgery. No chewing tobacco for 6 hours before you arrive. A nicotine patch is okay. You may spit out nicotine gum when you arrive.  For some surgeries, the surgeon will tell you to fully quit smoking and nicotine.  We will make every effort to keep you safe from infection. We will:  Clean our hands often with soap and water (or an alcohol-based hand rub).  Clean the skin at your surgery site with a special soap that kills germs.  Give you a special gown to  keep you warm. (Cold raises the risk of infection.)  Wear hair covers, masks, gowns, and gloves during surgery.  Give antibiotic medicine, if prescribed. Not all surgeries need this medicine.  What to bring on the day of surgery  Photo ID and insurance card  Copy of your health care directive, if you have one  Glasses and hearing aids (bring cases)  You can't wear contacts during surgery  Inhaler and eye drops, if you use them (tell us about these when you arrive)  CPAP machine or breathing device, if you use them  A few personal items, if spending the night  If you have . . .  A pacemaker, ICD (cardiac defibrillator), or other implant: Bring the ID card.  An implanted stimulator: Bring the remote control.  A legal guardian: Bring a copy of the certified (court-stamped) guardianship papers.  Please remove any jewelry, including body piercings. Leave jewelry and other valuables at home.  If you're going home the day of surgery  You must have a responsible adult drive you home. They should stay with you overnight as well.  If you don't have someone to stay with you, and you aren't safe to go home alone, we may keep you overnight. Insurance often won't pay for this.  After surgery  If it's hard to control your pain or you need more pain medicine, please call your surgeon's office.  Questions?   If you have any questions for your care team, list them here:   ____________________________________________________________________________________________________________________________________________________________________________________________________________________________________________________________  For informational purposes only. Not to replace the advice of your health care provider. Copyright   2003, 2019 [facility]. All rights reserved. Clinically reviewed by Terrell Dahl MD. [employer] [id number] - REV 08/24.

## 2025-03-18 NOTE — PROGRESS NOTES
Chief Complaint:     Chief Complaint   Patient presents with     Pharyngitis     Running Nose       Results for orders placed or performed in visit on 09/21/22   Streptococcus A Rapid Screen w/Reflex to PCR - Clinic Collect     Status: Normal    Specimen: Throat; Swab   Result Value Ref Range    Group A Strep antigen Negative Negative       Medical Decision Making:    Vital signs reviewed by Molina Sagastume PA-C  /65 (BP Location: Right arm, Patient Position: Sitting, Cuff Size: Child)   Pulse 60   Temp 98.6  F (37  C) (Tympanic)   Wt 35 kg (77 lb 3.2 oz)   SpO2 98%     Differential Diagnosis:  URI Adult/Peds:  Sinusitis, Strep pharyngitis, Tonsilitis, Viral pharyngitis, Viral syndrome and Viral upper respiratory illness        ASSESSMENT    1. Sore throat        PLAN    Patient is in no acute distress.    Temp is 98.6 in clinic today, lung sounds were clear, and O2 sats at 98% on RA.    RST was negative.  We will call with PCR results only if positive.  Rest, Push fluids, vaporizer.  Ibuprofen and or Tylenol for any fever or body aches.  If symptoms worsen, recheck immediately otherwise follow up with your PCP in 1 week if symptoms are not improving.  Worrisome symptoms discussed with instructions to go to the ED.  Parent verbalized understanding and agreed with this plan.    Labs:    Results for orders placed or performed in visit on 09/21/22   Streptococcus A Rapid Screen w/Reflex to PCR - Clinic Collect     Status: Normal    Specimen: Throat; Swab   Result Value Ref Range    Group A Strep antigen Negative Negative        Vital signs reviewed by Molina Sagastume PA-C  /65 (BP Location: Right arm, Patient Position: Sitting, Cuff Size: Child)   Pulse 60   Temp 98.6  F (37  C) (Tympanic)   Wt 35 kg (77 lb 3.2 oz)   SpO2 98%     Current Meds      Current Outpatient Medications:      guanFACINE HCl (INTUNIV) 4 MG TB24, Take 1 tablet (4 mg) by mouth At Bedtime, Disp: 90 tablet, Rfl: 0      Preoperative Evaluation  [address]  [address]  [facility] [address]  Phone: [phone]  Fax: [phone]  Primary Provider: Danelle Mesa MD  Pre-op Performing Provider: Danelle Mesa MD  Mar 18, 2025             3/13/2025   Surgical Information   What procedure is being done? removal of polyp detected in endoscopy 11/24   Facility or Hospital where procedure/surgery will be performed: [facility]   Who is doing the procedure / surgery? Dr Pena   Date of surgery / procedure: 3/20/25, endoscopic retrograde cholangiopancreatography with ampullectomy, removal of polyp   Time of surgery / procedure: 10:00am   Where do you plan to recover after surgery? at home alone     Fax number for surgical facility: [phone]    Assessment & Plan     The proposed surgical procedure is considered LOW risk.    Preop general physical exam  Cleared for endoscopic ampullectomy     Adenomatous duodenal polyp  Per her report.  Is being managed by [facility]     - No identified additional risk factors other than previously addressed    Preoperative Medication Instructions  Antiplatelet or Anticoagulation Medication Instructions   - We reviewed the medication list and the patient is not on an antiplatelet or anticoagulation medications.    Additional Medication Instructions  We reviewed the medication list and there are no chronic medications that need to be adjusted for this procedure.    Recommendation  Approval given to proceed with proposed procedure, without further diagnostic evaluation.    The longitudinal plan of care for the diagnosis(es)/condition(s) as documented were addressed during this visit. Due to the added complexity in care, I will continue to support Lou in the subsequent management and with ongoing continuity of care.      Subjective   Lou is a 69 year old, presenting for the following:  Pre-Op Exam (No concerns)          3/18/2025     2:12 PM   Additional  "guanFACINE HCl (INTUNIV) 4 MG TB24, Take 1 tablet (4 mg) by mouth At Bedtime, Disp: 30 tablet, Rfl: 2     ibuprofen (ADVIL/MOTRIN) 100 MG/5ML suspension, Take 10 mg/kg by mouth every 6 hours as needed for fever or moderate pain (4-6), Disp: , Rfl:      methylphenidate (CONCERTA) 36 MG CR tablet, Take 1 tablet (36 mg) by mouth daily for 30 days, Disp: 30 tablet, Rfl: 0     [START ON 9/30/2022] methylphenidate (CONCERTA) 36 MG CR tablet, Take 1 tablet (36 mg) by mouth daily for 30 days, Disp: 30 tablet, Rfl: 0     [START ON 10/31/2022] methylphenidate (CONCERTA) 36 MG CR tablet, Take 1 tablet (36 mg) by mouth daily for 30 days, Disp: 30 tablet, Rfl: 0     methylphenidate (CONCERTA) 36 MG CR tablet, Take 1 tablet (36 mg) by mouth daily, Disp: 30 tablet, Rfl: 0     guanFACINE HCl (INTUNIV) 4 MG TB24, GIVE \"XAVIER\" 1 TABLET(4 MG) BY MOUTH AT BEDTIME (Patient not taking: No sig reported), Disp: 90 tablet, Rfl: 0      Respiratory History    no history of pneumonia or bronchitis      SUBJECTIVE    HPI: Xavier Hopoer is an 9 year old female who presents with nasal discharge clear and sore throat.  Symptoms began 2  days ago and has unchanged.  There is no shortness of breath and wheezing.  Patient is eating and drinking well.  No fever, nausea, vomiting, or diarrhea.    Parent denies any recent travel or exposure to known COVID positive tested individual.      ROS:     Review of Systems   Constitutional: Negative for chills, fatigue and fever.   HENT: Positive for rhinorrhea and sore throat. Negative for congestion and ear pain.    Eyes: Negative.  Negative for discharge, redness and itching.   Respiratory: Negative for cough and shortness of breath.    Gastrointestinal: Negative for diarrhea, nausea and vomiting.   Endocrine: Negative.  Negative for cold intolerance, heat intolerance and polyuria.   Genitourinary: Negative.  Negative for dysuria, flank pain, hematuria and urgency.   Musculoskeletal: Negative.  " Questions   Roomed by Glenda   Accompanied by self         3/18/2025    Information    services provided? No     HPI:     Had a duodenal polyp picked up a few months ago and is getting it removed.  Not sure exactly how but thinks is endoscopy.  Was worked up due to some GI complaints.  Also found she has 14 colonic polyps and needs scope in a year    Has had intermittent twinging pain in the right mid gut, that she was hoping to figure out what it was coming from. Had a CT scan recently and wondering about appendix since not visible on CT.  Simón is getting better.             3/13/2025   Pre-Op Questionnaire   Have you ever had a heart attack or stroke? No   Have you ever had surgery on your heart or blood vessels, such as a stent placement, a coronary artery bypass, or surgery on an artery in your head, neck, heart, or legs? No   Do you have chest pain with activity? No   Do you have a history of heart failure? No   Do you currently have a cold, bronchitis or symptoms of other infection? No   Do you have a cough, shortness of breath, or wheezing? No   Do you or anyone in your family have previous history of blood clots? No   Do you or does anyone in your family have a serious bleeding problem such as prolonged bleeding following surgeries or cuts? No   Have you ever had problems with anemia or been told to take iron pills? No   Have you had any abnormal blood loss such as black, tarry or bloody stools, or abnormal vaginal bleeding? No   Have you ever had a blood transfusion? No   Are you willing to have a blood transfusion if it is medically needed before, during, or after your surgery? Yes   Have you or any of your relatives ever had problems with anesthesia? No   Do you have sleep apnea, excessive snoring or daytime drowsiness? No   Do you have any artifical heart valves or other implanted medical devices like a pacemaker, defibrillator, or continuous glucose monitor? No   Do you have  Negative for myalgias, neck pain and neck stiffness.   Skin: Negative.  Negative for rash.   Allergic/Immunologic: Negative.  Negative for immunocompromised state.   Neurological: Negative.  Negative for dizziness and headaches.   Hematological: Negative.  Does not bruise/bleed easily.   Psychiatric/Behavioral: Negative.  Negative for agitation and confusion.         Family History   Family History   Problem Relation Age of Onset     Obesity Mother      Diabetes Maternal Grandmother         Great Grandmother     Hypertension Maternal Grandmother         Great Grandmother     Asthma Brother      Other Cancer Brother         Wong's Sarcoma        Problem history  Patient Active Problem List   Diagnosis     NO ACTIVE PROBLEMS     Attention deficit hyperactivity disorder (ADHD), predominantly hyperactive type     Molluscum contagiosum        Allergies  No Known Allergies     Social History  Social History     Socioeconomic History     Marital status: Single     Spouse name: Not on file     Number of children: Not on file     Years of education: Not on file     Highest education level: Not on file   Occupational History     Not on file   Tobacco Use     Smoking status: Never Smoker     Smokeless tobacco: Never Used   Vaping Use     Vaping Use: Never used   Substance and Sexual Activity     Alcohol use: No     Drug use: No     Sexual activity: Never   Other Topics Concern     Not on file   Social History Narrative     Not on file     Social Determinants of Health     Financial Resource Strain: Not on file   Food Insecurity: Not on file   Transportation Needs: Not on file   Physical Activity: Not on file   Stress: Not on file   Intimate Partner Violence: Not on file   Housing Stability: Not on file        OBJECTIVE     Vital signs reviewed by Molina Sagastume PA-C  /65 (BP Location: Right arm, Patient Position: Sitting, Cuff Size: Child)   Pulse 60   Temp 98.6  F (37  C) (Tympanic)   Wt 35 kg (77 lb 3.2 oz)    artificial joints? (!) YES   Are you allergic to latex? No     Health Care Directive  Patient does not have a Health Care Directive: Patient states has Advance Directive and will bring in a copy to clinic. Her daughter Radha Chaudhary is her decision maker.     Preoperative Review of    reviewed - no record of controlled substances prescribed.          Patient Active Problem List    Diagnosis Date Noted    Hiatal hernia 02/21/2025     Priority: Medium    Adenomatous duodenal polyp 12/26/2024     Priority: Medium     12/2024: noted on EGD for bloating.  1.5 cm. Second procedure scheduled for removal.   1/2025: repeat colonoscopy with multiple polyps, adenomas, larger than 10 mm. Rec: repeat in 1 year      Herpes simplex virus infection 06/12/2024     Priority: Medium     Dx: 2023      History of melanoma in situ 11/02/2023     Priority: Medium    Xerosis cutis 05/06/2023     Priority: Medium    Multiple benign nevi 05/06/2023     Priority: Medium    Actinic keratosis 05/06/2023     Priority: Medium    History of dysplastic nevus 11/22/2017     Priority: Medium    Hematochezia 09/29/2017     Priority: Medium    Benign neoplasm of rectum 09/29/2017     Priority: Medium     9/2017: One 4 mm sessile polyp. Rec repeat scope in 5 years if adenoma and 10 if not.       Idiopathic scoliosis 02/10/2017     Priority: Medium    History of melanoma 03/28/2016     Priority: Medium      Past Medical History:   Diagnosis Date    Malignant melanoma (H)     Scoliosis      Past Surgical History:   Procedure Laterality Date    BIOPSY OF SKIN LESION      MOHS MICROGRAPHIC PROCEDURE       Current Outpatient Medications   Medication Sig Dispense Refill    acetaminophen (TYLENOL) 325 MG tablet Take 325-650 mg by mouth as needed      EPINEPHrine (ANY BX GENERIC EQUIV) 0.3 MG/0.3ML injection 2-pack Inject one pen into the muscle 2 each 0    valACYclovir (VALTREX) 500 MG tablet Take 0.5 tablets (250 mg) by mouth daily. 45 tablet 3  "      Allergies   Allergen Reactions    Wasp Venom Protein Anaphylaxis        Social History     Tobacco Use    Smoking status: Former    Smokeless tobacco: Never    Tobacco comments:     Smoked for 6 months   Substance Use Topics    Alcohol use: Yes     Comment: social       History   Drug Use No             Review of Systems  Constitutional, HEENT, cardiovascular, pulmonary, gi and gu systems are negative, except as otherwise noted.    Objective    BP (!) 161/85   Pulse 88   Temp 97.9  F (36.6  C) (Oral)   Resp 16   Ht 1.67 m (5' 5.75\")   Wt 72.6 kg (160 lb)   SpO2 98%   BMI 26.02 kg/m     Estimated body mass index is 26.02 kg/m  as calculated from the following:    Height as of this encounter: 1.67 m (5' 5.75\").    Weight as of this encounter: 72.6 kg (160 lb).  Physical Exam  GENERAL: alert and no distress  HEENT: DANIEL, EOMI, TM clear, OP - clear and ample posterior pharyngeal space  NECK: no adenopathy, no asymmetry, masses, or scars  RESP: lungs clear to auscultation - no rales, rhonchi or wheezes  CV: regular rate and rhythm, normal S1 S2, no S3 or S4, no murmur, click or rub, no peripheral edema  ABDOMEN: soft, nontender, no hepatosplenomegaly, no masses and bowel sounds normal  MS: no gross musculoskeletal defects noted, no edema, scoliosis    Recent Labs   Lab Test 12/18/24  1640   *   POTASSIUM 4.6   CR 0.60        Diagnostics  No labs were ordered during this visit.   No EKG required for low risk surgery (cataract, skin procedure, breast biopsy, etc).    Revised Cardiac Risk Index (RCRI)  The patient has the following serious cardiovascular risks for perioperative complications:   - No serious cardiac risks = 0 points     RCRI Interpretation: 0 points: Class I (very low risk - 0.4% complication rate)         Signed Electronically by: Danelle Mesa MD  A copy of this evaluation report is provided to the requesting physician.        " SpO2 98%      Physical Exam  Vitals and nursing note reviewed.   Constitutional:       General: She is not in acute distress.     Appearance: She is not diaphoretic.   HENT:      Right Ear: Hearing, tympanic membrane and external ear normal. No pain on movement. No drainage, swelling or tenderness. Tympanic membrane is not perforated, erythematous, retracted or bulging.      Left Ear: Hearing, tympanic membrane and external ear normal. No pain on movement. No drainage, swelling or tenderness. Tympanic membrane is not perforated, erythematous, retracted or bulging.      Nose: Mucosal edema, congestion and rhinorrhea present.      Mouth/Throat:      Mouth: Mucous membranes are moist.      Pharynx: Posterior oropharyngeal erythema present. No pharyngeal swelling, oropharyngeal exudate, pharyngeal petechiae or uvula swelling.      Tonsils: No tonsillar exudate. 0 on the right. 0 on the left.   Eyes:      General:         Right eye: No discharge.         Left eye: No discharge.      Conjunctiva/sclera: Conjunctivae normal.      Pupils: Pupils are equal, round, and reactive to light.   Cardiovascular:      Rate and Rhythm: Regular rhythm.      Heart sounds: S1 normal and S2 normal.   Pulmonary:      Effort: Pulmonary effort is normal. No accessory muscle usage, respiratory distress, nasal flaring or retractions.      Breath sounds: Normal breath sounds and air entry. No stridor, decreased air movement or transmitted upper airway sounds. No decreased breath sounds, wheezing, rhonchi or rales.   Abdominal:      General: Bowel sounds are normal. There is no distension.      Palpations: Abdomen is soft.      Tenderness: There is no abdominal tenderness.   Musculoskeletal:         General: No tenderness. Normal range of motion.      Cervical back: Normal range of motion.   Lymphadenopathy:      Cervical: No cervical adenopathy.   Skin:     General: Skin is warm.      Capillary Refill: Capillary refill takes less than 2  seconds.   Neurological:      Mental Status: She is alert.      Cranial Nerves: No cranial nerve deficit.      Sensory: No sensory deficit.      Motor: No abnormal muscle tone.      Coordination: Coordination normal.      Deep Tendon Reflexes: Reflexes normal.           Molina Sagastume PA-C  9/21/2022, 7:57 PM

## 2025-04-23 ENCOUNTER — OFFICE VISIT (OUTPATIENT)
Dept: DERMATOLOGY | Facility: CLINIC | Age: 70
End: 2025-04-23
Payer: COMMERCIAL

## 2025-04-23 DIAGNOSIS — L57.0 ACTINIC KERATOSIS: ICD-10-CM

## 2025-04-23 DIAGNOSIS — K12.0 APHTHOUS ULCER OF MOUTH: Primary | ICD-10-CM

## 2025-04-23 RX ORDER — FLUOCINONIDE 0.5 MG/G
OINTMENT TOPICAL 2 TIMES DAILY
Qty: 15 G | Refills: 1 | Status: SHIPPED | OUTPATIENT
Start: 2025-04-23

## 2025-04-23 ASSESSMENT — PAIN SCALES - GENERAL: PAINLEVEL_OUTOF10: MILD PAIN (1)

## 2025-04-23 NOTE — NURSING NOTE
Dermatology Rooming Note    Lou Chaudhary's goals for this visit include:   Chief Complaint   Patient presents with    Derm Problem     Spot check. One on the left front lip and another on her nose and a few spots on her neck.      Darren Mena, EMT  Clinic Support  [facility]     [phone]    Employed by [employer]

## 2025-04-23 NOTE — PATIENT INSTRUCTIONS
Cryotherapy    What is it?  Use of a very cold liquid, such as liquid nitrogen, to freeze and destroy abnormal skin cells that need to be removed    What should I expect?  Tenderness and redness  A small blister that might grow and fill with dark purple blood. There may be crusting.  More than one treatment may be needed if the lesions do not go away.    How do I care for the treated area?  Gently wash the area with your hands when bathing.  Use a thin layer of Vaseline to help with healing. You may use a Band-Aid.   The area should heal within 7-10 days and may leave behind a pink or lighter color.   Do not use an antibiotic or Neosporin ointment.   You may take acetaminophen (Tylenol) for pain.     Call your doctor if you have:  Severe pain  Signs of infection (warmth, redness, cloudy yellow drainage, and or a bad smell)  Questions or concerns    Who should I call with questions?      [facility]: [phone]      [facility]: [phone]      For urgent needs outside of business hours call the [facility] at [phone] and ask for the dermatology resident on call

## 2025-04-23 NOTE — PROGRESS NOTES
[facility] Dermatology Note  Encounter Date: Apr 23, 2025  Office Visit     Dermatology Problem List:  1. Malignant melanoma, superficial spreading, right dorsal forearm  - Stage IA, Breslow's 0.42m, no mites, nonulcerated  - S/p WLE 12/8/2015  2. NMSC  - BCC, R lower medial leg, superficial, s/p bx 5/13/24, s/p Mohs 7/16/24  - BCC, L lateral lower leg s/p Mohs and linear repair 11/15/22  - BCC, right post auricular s/p Mohs 12/5/2017  3.  Actinic keratosis  - Cryotherapy today   4. Mildly dysplastic junctional nevus, lower back  - S/p excision 11/27/2015  5. Psoriasiform dermatitis, right earlobe  - S/p biopsy 1/2016  - No current flare or tx       ____________________________________________    Assessment & Plan:     # AK x 2 of nose, x 2 of right chest and one on left upper cutaneous lip.   - Cryotherapy performed today (see procedure note(s) below).      # Aphthous ulcer right lower inner gumline.   Lidex ointment twice daily to sore      Procedures Performed:   - Cryotherapy procedure note, location(s): nose, left upper cutaneous lip and right chest. After verbal consent and discussion of risks and benefits including, but not limited to, dyspigmentation/scar, blister, and pain, 5 lesion(s) was(were) treated with 1-2 mm freeze border for 1-2 cycles with liquid nitrogen. Post cryotherapy instructions were provided.      [facility]: [facility]    Staff:     Melinda Fierro MD  ____________________________________________    CC: Derm Problem (Spot check. One on the left front lip and another on her nose and a few spots on her neck. )    HPI:  Ms. Lou Chaudhary is a(n) 69 year old female who presents today as a return patient for persistent gritty papules and a sore of the gumline which appeared after a procedure.    Patient is otherwise feeling well, without additional skin concerns.     Labs Reviewed:  N/A    Physical Exam:  Vitals: There were no vitals taken for this  visit.  SKIN: Focused examination of face, oral mucosa and upper chest was performed.  - red gritty papules of nose, chest and left upper cutaneous lip  - annular ulcer right lower inner gingiva     - No other lesions of concern on areas examined.     Medications:  Current Outpatient Medications   Medication Sig Dispense Refill    acetaminophen (TYLENOL) 325 MG tablet Take 325-650 mg by mouth as needed      EPINEPHrine (ANY BX GENERIC EQUIV) 0.3 MG/0.3ML injection 2-pack Inject one pen into the muscle 2 each 0    valACYclovir (VALTREX) 500 MG tablet Take 0.5 tablets (250 mg) by mouth daily. 45 tablet 3     No current facility-administered medications for this visit.      Past Medical History:   Patient Active Problem List   Diagnosis    History of melanoma    Idiopathic scoliosis    Hematochezia    Benign neoplasm of rectum    History of dysplastic nevus    Xerosis cutis    Multiple benign nevi    Actinic keratosis    History of melanoma in situ    Herpes simplex virus infection    Adenomatous duodenal polyp    Hiatal hernia     Past Medical History:   Diagnosis Date    Malignant melanoma (H)     Scoliosis        CC Referred Self, MD  No address on file on close of this encounter.

## 2025-04-23 NOTE — LETTER
4/23/2025       RE: Lou Chaudhary  [address] [facility] [address]     Dear Colleague,    Thank you for referring your patient, Lou Chaudhary, to the [facility] [place] at [facility]. Please see a copy of my visit note below.    [facility] Dermatology Note  Encounter Date: Apr 23, 2025  Office Visit     Dermatology Problem List:  1. Malignant melanoma, superficial spreading, right dorsal forearm  - Stage IA, Breslow's 0.42m, no mites, nonulcerated  - S/p WLE 12/8/2015  2. NMSC  - BCC, R lower medial leg, superficial, s/p bx 5/13/24, s/p Mohs 7/16/24  - BCC, L lateral lower leg s/p Mohs and linear repair 11/15/22  - BCC, right post auricular s/p Mohs 12/5/2017  3.  Actinic keratosis  - Cryotherapy today   4. Mildly dysplastic junctional nevus, lower back  - S/p excision 11/27/2015  5. Psoriasiform dermatitis, right earlobe  - S/p biopsy 1/2016  - No current flare or tx       ____________________________________________    Assessment & Plan:     # AK x 2 of nose, x 2 of right chest and one on left upper cutaneous lip.   - Cryotherapy performed today (see procedure note(s) below).      # Aphthous ulcer right lower inner gumline.   Lidex ointment twice daily to sore      Procedures Performed:   - Cryotherapy procedure note, location(s): nose, left upper cutaneous lip and right chest. After verbal consent and discussion of risks and benefits including, but not limited to, dyspigmentation/scar, blister, and pain, 5 lesion(s) was(were) treated with 1-2 mm freeze border for 1-2 cycles with liquid nitrogen. Post cryotherapy instructions were provided.      [facility]: [facility]    Staff:     Melinda Fierro MD  ____________________________________________    CC: Derm Problem (Spot check. One on the left front lip and another on her nose and a few spots on her neck. )    HPI:  Ms. Lou Chaudhary is a(n) 69  year old female who presents today as a return patient for persistent gritty papules and a sore of the gumline which appeared after a procedure.    Patient is otherwise feeling well, without additional skin concerns.     Labs Reviewed:  N/A    Physical Exam:  Vitals: There were no vitals taken for this visit.  SKIN: Focused examination of face, oral mucosa and upper chest was performed.  - red gritty papules of nose, chest and left upper cutaneous lip  - annular ulcer right lower inner gingiva     - No other lesions of concern on areas examined.     Medications:  Current Outpatient Medications   Medication Sig Dispense Refill     acetaminophen (TYLENOL) 325 MG tablet Take 325-650 mg by mouth as needed       EPINEPHrine (ANY BX GENERIC EQUIV) 0.3 MG/0.3ML injection 2-pack Inject one pen into the muscle 2 each 0     valACYclovir (VALTREX) 500 MG tablet Take 0.5 tablets (250 mg) by mouth daily. 45 tablet 3     No current facility-administered medications for this visit.      Past Medical History:   Patient Active Problem List   Diagnosis     History of melanoma     Idiopathic scoliosis     Hematochezia     Benign neoplasm of rectum     History of dysplastic nevus     Xerosis cutis     Multiple benign nevi     Actinic keratosis     History of melanoma in situ     Herpes simplex virus infection     Adenomatous duodenal polyp     Hiatal hernia     Past Medical History:   Diagnosis Date     Malignant melanoma (H)      Scoliosis        CC Referred Self, MD  No address on file on close of this encounter.       Again, thank you for allowing me to participate in the care of your patient.      Sincerely,    Melinda Fierro MD

## 2025-08-11 ENCOUNTER — TRANSFERRED RECORDS (OUTPATIENT)
Dept: HEALTH INFORMATION MANAGEMENT | Facility: CLINIC | Age: 70
End: 2025-08-11
Payer: COMMERCIAL

## 2025-08-18 ENCOUNTER — TELEPHONE (OUTPATIENT)
Dept: DERMATOLOGY | Facility: CLINIC | Age: 70
End: 2025-08-18

## 2025-08-18 ENCOUNTER — OFFICE VISIT (OUTPATIENT)
Dept: DERMATOLOGY | Facility: CLINIC | Age: 70
End: 2025-08-18
Payer: COMMERCIAL

## 2025-08-18 DIAGNOSIS — L82.1 SEBORRHEIC KERATOSIS: Primary | ICD-10-CM

## 2025-08-18 DIAGNOSIS — Z85.820 HISTORY OF MELANOMA: ICD-10-CM

## 2025-08-18 DIAGNOSIS — Z85.828 HISTORY OF NONMELANOMA SKIN CANCER: ICD-10-CM

## 2025-08-18 DIAGNOSIS — D22.9 MULTIPLE BENIGN NEVI: ICD-10-CM

## 2025-08-18 PROCEDURE — 99213 OFFICE O/P EST LOW 20 MIN: CPT | Mod: GC | Performed by: DERMATOLOGY
